# Patient Record
Sex: FEMALE | Race: WHITE | NOT HISPANIC OR LATINO | Employment: UNEMPLOYED | URBAN - NONMETROPOLITAN AREA
[De-identification: names, ages, dates, MRNs, and addresses within clinical notes are randomized per-mention and may not be internally consistent; named-entity substitution may affect disease eponyms.]

---

## 2017-01-22 ENCOUNTER — COMMUNICATION - GICH (OUTPATIENT)
Dept: FAMILY MEDICINE | Facility: OTHER | Age: 57
End: 2017-01-22

## 2017-01-22 DIAGNOSIS — E78.5 HYPERLIPIDEMIA: ICD-10-CM

## 2017-01-22 DIAGNOSIS — F33.0 MAJOR DEPRESSIVE DISORDER, RECURRENT, MILD (H): ICD-10-CM

## 2017-04-21 ENCOUNTER — COMMUNICATION - GICH (OUTPATIENT)
Dept: FAMILY MEDICINE | Facility: OTHER | Age: 57
End: 2017-04-21

## 2017-04-21 DIAGNOSIS — F33.0 MAJOR DEPRESSIVE DISORDER, RECURRENT, MILD (H): ICD-10-CM

## 2017-04-21 DIAGNOSIS — E78.5 HYPERLIPIDEMIA: ICD-10-CM

## 2017-06-01 ENCOUNTER — OFFICE VISIT - GICH (OUTPATIENT)
Dept: FAMILY MEDICINE | Facility: OTHER | Age: 57
End: 2017-06-01

## 2017-06-01 ENCOUNTER — HISTORY (OUTPATIENT)
Dept: FAMILY MEDICINE | Facility: OTHER | Age: 57
End: 2017-06-01

## 2017-06-01 DIAGNOSIS — F33.0 MAJOR DEPRESSIVE DISORDER, RECURRENT, MILD (H): ICD-10-CM

## 2017-06-01 DIAGNOSIS — Z12.31 ENCOUNTER FOR SCREENING MAMMOGRAM FOR MALIGNANT NEOPLASM OF BREAST: ICD-10-CM

## 2017-06-01 DIAGNOSIS — Z12.4 ENCOUNTER FOR SCREENING FOR MALIGNANT NEOPLASM OF CERVIX: ICD-10-CM

## 2017-06-01 DIAGNOSIS — E78.5 HYPERLIPIDEMIA: ICD-10-CM

## 2017-06-01 DIAGNOSIS — Z00.00 ENCOUNTER FOR GENERAL ADULT MEDICAL EXAMINATION WITHOUT ABNORMAL FINDINGS: ICD-10-CM

## 2017-06-01 LAB
A/G RATIO - HISTORICAL: 1.8 (ref 1–2)
ALBUMIN SERPL-MCNC: 4.6 G/DL (ref 3.5–5.7)
ALP SERPL-CCNC: 101 IU/L (ref 34–104)
ALT (SGPT) - HISTORICAL: 29 IU/L (ref 7–52)
ANION GAP - HISTORICAL: 8 (ref 5–18)
AST SERPL-CCNC: 30 IU/L (ref 13–39)
BILIRUB SERPL-MCNC: 0.4 MG/DL (ref 0.3–1)
BUN SERPL-MCNC: 15 MG/DL (ref 7–25)
BUN/CREAT RATIO - HISTORICAL: 15
CALCIUM SERPL-MCNC: 9.6 MG/DL (ref 8.6–10.3)
CHLORIDE SERPLBLD-SCNC: 105 MMOL/L (ref 98–107)
CHOL/HDL RATIO - HISTORICAL: 3.31
CHOLESTEROL TOTAL: 205 MG/DL
CO2 SERPL-SCNC: 26 MMOL/L (ref 21–31)
CREAT SERPL-MCNC: 1.02 MG/DL (ref 0.7–1.3)
GFR IF NOT AFRICAN AMERICAN - HISTORICAL: 56 ML/MIN/1.73M2
GLOBULIN - HISTORICAL: 2.6 G/DL (ref 2–3.7)
GLUCOSE SERPL-MCNC: 96 MG/DL (ref 70–105)
HDLC SERPL-MCNC: 62 MG/DL (ref 23–92)
LDLC SERPL CALC-MCNC: 124 MG/DL
NON-HDL CHOLESTEROL - HISTORICAL: 143 MG/DL
PATIENT STATUS - HISTORICAL: ABNORMAL
POTASSIUM SERPL-SCNC: 4.5 MMOL/L (ref 3.5–5.1)
PROT SERPL-MCNC: 7.2 G/DL (ref 6.4–8.9)
SODIUM SERPL-SCNC: 139 MMOL/L (ref 133–143)
TRIGL SERPL-MCNC: 97 MG/DL

## 2017-12-27 NOTE — PROGRESS NOTES
"Patient Information     Patient Name MRN Sex Lyn Jackman [id number] Female 1960      Progress Notes by Kika Tellez MD at 2017  8:30 AM     Author:  Kika Tellez MD Service:  (none) Author Type:  Physician     Filed:  2017  9:15 AM Encounter Date:  2017 Status:  Signed     :  Kika Tellez MD (Physician)              SUBJECTIVE:    Lyn Massey is a 56 y.o. female who presents for physical, medication refills and is overdue for lab work.   Again discussed colonoscopy and she will consider but is not ready to schedule.  Continues to smoke at about one half pack per day and is not motivated to quit. She has quit at intervals in the past. Indicates that it's been \"a stressful year\" with her daughter again back in the [employer] program with her eating disorder this last year.    PROBLEM LIST:  Patient Active Problem List     Diagnosis  Code     Hyperlipidemia E78.5     Factor V Leiden (HC) D68.51     TOBACCO ABUSE F17.200     Dystrophic nail L60.3     Major depression in remission (HC) F32.5     PAST MEDICAL HISTORY:  Past Medical History:     Diagnosis  Date     Fracture of finger, left 2011    Index finger and laceration      Hx of pregnancy     [id number] spontaneous first trimester losses      SURGICAL HISTORY:  Past Surgical History:      Procedure  Laterality Date      SECTION       SHOULDER ARTHROSCOPY  2008    Right         SOCIAL HISTORY:  Social History     Social History        Marital status:       Spouse name: Manish     Number of children:  1     Years of education:  N/A     Occupational History       Fire aviation dispatcher [employer]     Social History Main Topics         Smoking status:   Current Every Day Smoker     Packs/day:  0.25     Years:  30.00     Types:  Cigarettes     Smokeless tobacco:   Never Used     Alcohol use   No      Comment: wine 6 glasses per week      Drug use:   No     Sexual activity:   Yes     Partners:  Male "     Other Topics   Concern     Caffeine Concern  No     3-4 cups a day in morning      Special Diet  No     Exercise  Yes     Walks, PT for right shoulder      Seat Belt  Yes     Social History Narrative     She is .  She lives in [place].     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                   FAMILY HISTORY:  Family History       Problem   Relation Age of Onset     Stroke  Father      Other  Father      AAA, kidney disease       Other  Mother      Blood clots       Factor V Leiden deficiency  Mother      Blood Disease  Sister      Factor V leiden deficiency       Blood Disease  Sister      Factor V leiden deficiency       Blood Disease  Daughter      Factor V Leiden deficiency       Eating disorder  Daughter       CURRENT MEDICATIONS:   Current Outpatient Prescriptions       Medication  Sig Dispense Refill     multivitamin (MVI) tablet Take 1 tablet by mouth once daily.       rosuvastatin (CRESTOR) 10 mg tablet Take 1 tablet by mouth once daily with evening meal. 90 tablet 9     sertraline (ZOLOFT) 100 mg tablet Take 1 tablet by mouth once daily. 90 tablet 4     No current facility-administered medications for this visit.      Medications have been reviewed by me and are current to the best of my knowledge and ability.    ALLERGIES:  Review of patient's allergies indicates no known allergies.     REVIEW OF SYSTEMS:  General: denies any general problems.  Eyes: denies problems  Ears/Nose/Throat: denies problems  Cardiovascular: denies problems  Respiratory: denies problems  Gastrointestinal: denies problems  Genitourinary: denies problems  Musculoskeletal: denies problems  Skin: denies problems  Neurologic: denies problems  Psychiatric: See HPI, continues on zoloft  Endocrine: denies problems  Heme/Lymphatic: denies problems  Allergic/Immunologic: denies problems  PHQ Depression Screening 6/1/2017   Date of PHQ  "exam (doc flow) 6/1/2017   1. Lack of interest/pleasure 0 - Not at all   2. Feeling down/depressed 0 - Not at all   PHQ-2 TOTAL SCORE 0   3. Trouble sleeping -   4. Decreased energy -   5. Appetite change -   6. Feelings of failure -   7. Trouble concentrating -   8. Activity level -   9. Hurting yourself -   PHQ-9 TOTAL SCORE -   PHQ-9 Severity Level -   Functional Impairment -      OBJECTIVE:  /90  Pulse 60  Ht 1.56 m (5' 1.42\")  Wt 57 kg (125 lb 9.6 oz)  LMP 08/15/2012  BMI 23.41 kg/m2  EXAM:   General Appearance: Pleasant, alert, appropriate appearance for age. No acute distress  Ear Exam: Normal TM's bilaterally. Normal auditory canals and external ears. Non-tender.  Nose Exam: Normal external nose, mucus membranes, and septum.  OroPharynx Exam:  Dental hygiene adequate. Normal buccal mucose. Normal pharynx.  Neck Exam:  Supple, no masses or nodes.  Thyroid Exam: No nodules or enlargement.  Chest/Respiratory Exam: Normal chest wall and respirations. Clear to auscultation.  Breast Exam: No dimpling, nipple retraction or discharge. No masses or nodes.  Cardiovascular Exam: Regular rate and rhythm. S1, S2, no murmur, click, gallop, or rubs.  Gastrointestinal Exam: Soft, non-tender, no masses or organomegaly.  Rectal Exam: Normal sphincter tone. No masses noted.  Genitourinary Exam Female: External genitalia, vulva and vagina appear atrophic. Cervix also atrophic in appearance with a stenotic os and pap obtained from the surface, unable to use sample endocervical canal. Bimanual exam reveals normal uterus and adnexa, nontender urethra and bladder.   Lymphatic Exam: Non-palpable nodes in neck, clavicular, axillary, or inguinal regions.  Musculoskeletal Exam: Back is straight and non-tender, full ROM of upper and lower extremities.  Foot Exam: Left and right foot: good pedal pulses, no lesions, nail hygiene good.  Skin: no rash or abnormalities  Neurologic Exam: Nonfocal, symmetric DTRs, normal gross " motor, tone coordination and no tremor.  Psychiatric Exam: Alert and oriented - appropriate affect.    ASSESSMENT/PLAN    ICD-10-CM    1. Health maintenance examination Z00.00    2. Hyperlipidemia, unspecified hyperlipidemia type E78.5 COMPLETE METABOLIC PANEL      LIPID PANEL      XR MAMMO BILAT SCREENING      rosuvastatin (CRESTOR) 10 mg tablet      COMPLETE METABOLIC PANEL      LIPID PANEL   3. Major depressive disorder, recurrent episode, mild (HC) F33.0 sertraline (ZOLOFT) 100 mg tablet   4. Screening mammogram, encounter for Z12.31 XR MAMMO BILAT SCREENING   5. Pap smear for cervical cancer screening Z12.4 GYN THIN PREP PAP SCREEN IMAGED      GYN THIN PREP PAP SCREEN IMAGED     Ms. Massey's Body mass index is 23.41 kg/(m^2). This is within the normal range for a 56 y.o. Normal range for ages 18+ is between 18.5 and 24.9.  BP Readings from Last 1 Encounters:06/01/17 : 158/90  Ms. Robins blood pressure is out of the normal range for adults. Per JNC-8 guidelines normal adult blood pressure is < 120/80, pre-hypertensive is between 120/80 and 139/89, and hypertension is 140/90 or greater. Risks of hypertension were discussed. Patient's strategy will be to recheck BP outside the clinic setting and if continues elevated then follow up.    Plan:  Mammogram scheduled.  Pap done.  Medications refilled.  Consider colonoscopy and call to schedule at her conveniencel.  Follow up annually.  Kika Tellez MD  9:06 AM 6/1/2017

## 2017-12-28 NOTE — PATIENT INSTRUCTIONS
Patient Information     Patient Name MRN Sex Lyn Jackman [id number] Female 1960      Patient Instructions by Kika Tellez MD at 2017  8:41 AM     Author:  Kika Tellez MD  Service:  (none) Author Type:  Physician     Filed:  2017  8:42 AM  Encounter Date:  2017 Status:  Addendum     :  Kika Tellez MD (Physician)        Related Notes: Original Note by Kika Tellez MD (Physician) filed at 2017  8:41 AM               Index [place] Islander All languages Related topics   Colonoscopy   ________________________________________________________________________  KEY POINTS    A colonoscopy is an exam of your large intestine, also called the colon, with a thin, flexible, lighted tube and tiny camera. This scope is put through your rectum and into your large intestine.    A colonoscopy is used to check for growths or cancer, or to find the cause of symptoms such as diarrhea, rectal bleeding, or other problems in your intestines.    You will be given instructions for clearing bowel movements from your intestines. Be sure to complete the bowel preparation as instructed, including what types of food and drink you can have in the days leading up to the procedure.  ________________________________________________________________________  What is a colonoscopy?  A colonoscopy is an exam of your large intestine, also called the colon, with a thin, flexible, lighted tube and tiny camera. This scope is put through your rectum and into your large intestine.  When is it used?  Colonoscopy is the most direct and complete way to check the entire lining of the colon. It is usually done for one of the following reasons:    Prevention and early detection of cancer. A colonoscopy can help your healthcare provider find growths (polyps) that might become cancer. The growths can then be removed before they become cancer. It can also help find colon cancer early, when the cancer is easier to  cure.  If you are 50 to 75 years old, your healthcare provider may recommend that you have a screening colonoscopy at least every 10 years. If you have a personal or family history that increases your risk of colon or rectal cancer, your provider may recommend that you start having the test at an earlier age and have the test more often. In some cases, the test may be recommended for people older than 75. People who are -American may have a screening colonoscopy at age 45.    Diagnosis of illness. If you have symptoms such as diarrhea, rectal bleeding, losing weight without trying to, or intestinal problems, you may have this test to try to find the cause of your symptoms.  How do I prepare for this procedure?    Find someone to give you a ride home after the procedure. You will not be allowed to drive yourself home.    Your healthcare provider will tell you when to stop eating and drinking before the procedure. This helps to keep you from vomiting during the procedure.    You may or may not need to take your regular medicines the day of the procedure. Tell your healthcare provider about all medicines and supplements that you take. Some products may increase your risk of side effects. Ask your healthcare provider if you need to avoid taking any medicine or supplements before the procedure.    Tell your healthcare provider if you have any food, medicine, or other allergies such as latex.    Follow any other instructions your healthcare provider gives you.    You will be given instructions for clearing bowel movements from your intestines. Be sure to complete the bowel preparation as instructed, including what types of food and drink you can have in the days leading up to the procedure. The exam may not be done or may have to be repeated if your intestine still has bowel movement in it. Medicines used to prepare for this procedure will cause you to have several watery bowel movements until only clear movements  occur. Stay close to the bathroom after you take the medicine. Talk to your pharmacist or healthcare provider about other symptoms you might have.    Ask any questions you have before the procedure. You should understand what your healthcare provider is going to do. You have the right to make decisions about your healthcare and to give permission for any tests or procedures.  What happens during this procedure?  This procedure may be done in the healthcare provider's office, outpatient clinic, or hospital.  Before the procedure you will be given medicine to help you relax, but you may be awake during the procedure.  You will lie on a table on your side with your knees bent and drawn up to your stomach. Your healthcare provider will pass the scope through your rectum and into your lower intestine and view the images of your intestines on a computer screen. Small amounts of air will be passed into your intestines so your provider can see as much of the area as possible.  If your provider sees anything abnormal during the exam, he or she may take small samples of tissue through the scope for lab tests. This is called a biopsy. Your provider may be able to remove polyps or small tumors through the scope.  What happens after this procedure?  After the procedure, you may stay in a recovery area until you are awake and alert enough to be driven home. It is normal to have gas and mild cramps for a few hours after the exam. This will last until your body passes the extra air. If polyps or other tissue is removed, you may see a small amount of blood in your bowel movements for a short time.  Follow your healthcare provider's instructions. Ask your provider:    How long it will take to recover    If there are activities you should avoid and when you can return to your normal activities    How to take care of yourself at home    What symptoms or problems you should watch for and what to do if you have them  Make sure you know  when you should come back for a checkup. Keep all appointments for provider visits or tests.  What are the risks of this procedure?  Every procedure or treatment has risks. Some possible risks of this procedure include:    You may have problems with anesthesia.    You may have infection or bleeding.    Other parts of your body may be injured during the procedure.  Ask your healthcare provider how the risks apply to you. Be sure to discuss any other questions or concerns that you may have.   Developed by [employer].  Adult Advisor 2016.3 published by [employer].  Last modified: 2016-03-30  Last reviewed: 2016-03-22  This content is reviewed periodically and is subject to change as new health information becomes available. The information is intended to inform and educate and is not a replacement for medical evaluation, advice, diagnosis or treatment by a healthcare professional.  References   Adult Advisor 2016.3 Index    Copyright   2016 [employer], a division of [employer] All rights reserved.

## 2018-01-03 NOTE — TELEPHONE ENCOUNTER
Patient Information     Patient Name MRN Lyn Feliciano [id number] Female 1960      Telephone Encounter by Kelly Campbell RN at 2017 12:11 PM     Author:  Kelly Campbell RN Service:  (none) Author Type:  (none)     Filed:  2017 12:13 PM Encounter Date:  2017 Status:  Signed     :  Kelly Campbell RN (NURS- Registered Nurse)            Statins    Office visit in the past 12 months.    Last visit with CARLTON HEWITT was on: 2015 in [facility]  Next visit with CARLTON HEWITT is on: No future appointment listed with this provider  Next visit with [facility] is on: No future appointment listed in this department    Lab testing requirements:  Lipids annually.  Repeat lipids 6-8 weeks after dosage or drug change.    Last Lipids:  Chol: 243    9/15/2015  T    9/15/2015  HDL:   57    9/15/2015  LDL:  160    9/15/2015  LDL DIRECT:  No results found in past 5 years    .    Concommitant use of fibrates and statins-If it is an addition to the medication list, review note and/or discuss with provider.  If already on medication list, refill.    Max refills 12 months from last office visit.      Depression-in adults 18 and over  SSRI    Office visit in the past 12 months or as indicated in chart.  Should have clinic visit 1-2 months after initial prescription.    Last visit with CARLTON HEWITT was on: 2015 in [facility]  Next visit with CARLTON HEWITT is on: No future appointment listed with this provider  Next visit with [facility] is on: No future appointment listed in this department    Max refills 12 months from last office visit or per providers notes.    Patient is due for medication management appointment. Limited refill provided at this time and letter sent for reminder to patient. Prescription refilled per RN Medication Refill Policy.................... Kelly Campbell ....................   1/23/2017   12:12 PM

## 2018-01-04 NOTE — TELEPHONE ENCOUNTER
Patient Information     Patient Name MRN Lyn Feliciano [id number] Female 1960      Telephone Encounter by Kelly Campbell RN at 2017  9:11 AM     Author:  Kelly Campbell RN Service:  (none) Author Type:  (none)     Filed:  2017  9:15 AM Encounter Date:  2017 Status:  Signed     :  Kelly Campbell RN (NURS- Registered Nurse)            Statins    Office visit in the past 12 months.    Last visit with CARLTON HEWITT was on: 2015 in [facility]  Next visit with CARLTON HEWITT is on: No future appointment listed with this provider  Next visit with [facility] is on: No future appointment listed in this department    Lab testing requirements:  Lipids annually.  Repeat lipids 6-8 weeks after dosage or drug change.    Last Lipids:  Chol: 243    9/15/2015  T    9/15/2015  HDL:   57    9/15/2015  LDL:  160    9/15/2015  LDL DIRECT:  No results found in past 5 years    .    Concommitant use of fibrates and statins-If it is an addition to the medication list, review note and/or discuss with provider.  If already on medication list, refill.    Max refills 12 months from last office visit.      Depression-in adults 18 and over  SSRI    Office visit in the past 12 months or as indicated in chart.  Should have clinic visit 1-2 months after initial prescription.    Last visit with CARLTON HEWITT was on: 2015 in [facility]  Next visit with CARLTON HEWITT is on: No future appointment listed with this provider  Next visit with Family Practice is on: No future appointment listed in this department    Max refills 12 months from last office visit or per providers notes.    Spoke with patient - patient states she will call back to schedule an annual review of medications and labs. Patient would like a short 30 day supply of medication.    Prescription refilled per RN Medication Refill Policy.................... Kelly ADAME  Shannan ....................  4/21/2017   9:13 AM

## 2018-01-27 VITALS
HEIGHT: 61 IN | SYSTOLIC BLOOD PRESSURE: 158 MMHG | WEIGHT: 125.6 LBS | BODY MASS INDEX: 23.71 KG/M2 | DIASTOLIC BLOOD PRESSURE: 90 MMHG | HEART RATE: 60 BPM

## 2018-02-19 ENCOUNTER — DOCUMENTATION ONLY (OUTPATIENT)
Dept: FAMILY MEDICINE | Facility: OTHER | Age: 58
End: 2018-02-19

## 2018-02-19 PROBLEM — Z72.0 TOBACCO ABUSE: Status: ACTIVE | Noted: 2018-02-19

## 2018-02-19 PROBLEM — E78.5 HYPERLIPIDEMIA: Status: ACTIVE | Noted: 2018-02-19

## 2018-02-19 PROBLEM — D68.51 FACTOR V LEIDEN (H): Status: ACTIVE | Noted: 2018-02-19

## 2018-02-19 RX ORDER — SERTRALINE HYDROCHLORIDE 100 MG/1
100 TABLET, FILM COATED ORAL DAILY
COMMUNITY
Start: 2017-06-01 | End: 2018-06-16

## 2018-02-19 RX ORDER — ROSUVASTATIN CALCIUM 10 MG/1
10 TABLET, COATED ORAL EVERY EVENING
COMMUNITY
Start: 2017-06-01 | End: 2018-06-16

## 2018-02-19 RX ORDER — DIPHENOXYLATE HYDROCHLORIDE AND ATROPINE SULFATE 2.5; .025 MG/1; MG/1
1 TABLET ORAL DAILY
COMMUNITY
End: 2022-10-13

## 2018-06-16 DIAGNOSIS — F32.5 MAJOR DEPRESSION IN REMISSION (H): ICD-10-CM

## 2018-06-16 DIAGNOSIS — E78.5 HYPERLIPIDEMIA: Primary | ICD-10-CM

## 2018-06-16 NOTE — LETTER
June 19, 2018      Lyn Massey  [address] [place] [address]        Dear Lyn,     This letter is to remind you that you are due for your annual exam.  Your last comprehensive visit was more than 12 months ago.    A LIMITED refill of your medications have been called into your pharmacy. Additional refills require you to complete this appointment.    Please call the clinic at [phone] to schedule your appointment.    If you should require additional refills before your scheduled appointment, please contact your pharmacy and we will refill your medication until the date of your appointment.    Thank you for choosing [facility] for your health care needs.    Sincerely,    Refill RN  [facility]

## 2018-06-19 RX ORDER — ROSUVASTATIN CALCIUM 10 MG/1
TABLET, COATED ORAL
Qty: 90 TABLET | Refills: 0 | Status: SHIPPED | OUTPATIENT
Start: 2018-06-19 | End: 2018-09-24

## 2018-06-19 RX ORDER — SERTRALINE HYDROCHLORIDE 100 MG/1
TABLET, FILM COATED ORAL
Qty: 90 TABLET | Refills: 0 | Status: SHIPPED | OUTPATIENT
Start: 2018-06-19 | End: 2018-09-24

## 2018-06-19 NOTE — TELEPHONE ENCOUNTER
Medication is being filled for 1 time refill only due to:  Patient needs to be seen because it has been more than one year since last visit.   Patient is due for medication management appointment. Limited refill provided at this time. [employer] message and/or letter sent for reminder to patient. Prescription refilled per RN Medication Refill Policy.................... Chioma Douglass ....................  6/19/2018   9:54 AM

## 2018-07-23 NOTE — PROGRESS NOTES
Patient Information     Patient Name  Lyn Massey MRN  [id number] Sex  Female   1960      Letter by Kika Tellez MD at      Author:  Kika Tellez MD Service:  (none) Author Type:  (none)    Filed:   Encounter Date:  2017 Status:  (Other)           Lyn Massey  [address]  [facility] [address]          2017    Dear Ms. Massey:    Following are the tests completed during your last clinic visit.  The results of these tests are normal and require no further attention.    Results for orders placed or performed in visit on 17      COMPLETE METABOLIC PANEL      Result  Value Ref Range    SODIUM 139 133 - 143 mmol/L    POTASSIUM 4.5 3.5 - 5.1 mmol/L    CHLORIDE 105 98 - 107 mmol/L    CO2,TOTAL 26 21 - 31 mmol/L    ANION GAP 8 5 - 18                    GLUCOSE 96 70 - 105 mg/dL    CALCIUM 9.6 8.6 - 10.3 mg/dL    BUN 15 7 - 25 mg/dL    CREATININE 1.02 0.70 - 1.30 mg/dL    BUN/CREAT RATIO           15                    GFR if African American >60 >60 ml/min/1.73m2    GFR if not  56 (L) >60 ml/min/1.73m2    ALBUMIN 4.6 3.5 - 5.7 g/dL    PROTEIN,TOTAL 7.2 6.4 - 8.9 g/dL    GLOBULIN                  2.6 2.0 - 3.7 g/dL    A/G RATIO 1.8 1.0 - 2.0                    BILIRUBIN,TOTAL 0.4 0.3 - 1.0 mg/dL    ALK PHOSPHATASE 101 34 - 104 IU/L    ALT (SGPT) 29 7 - 52 IU/L    AST (SGOT) 30 13 - 39 IU/L   LIPID PANEL      Result  Value Ref Range    CHOLESTEROL,TOTAL 205 (H) <200 mg/dL    TRIGLYCERIDES 97 <150 mg/dL    HDL CHOLESTEROL 62 23 - 92 mg/dL    NON-HDL CHOLESTEROL 143 <145 mg/dl    CHOL/HDL RATIO            3.31 <4.50                    LDL CHOLESTEROL 124 (H) <100 mg/dL    PATIENT STATUS            NOT GIVEN                         If you have any further questions or problems contact my office at  [phone].    Sincerely,    Kika Tellez MD  11:04 AM 2017

## 2018-07-23 NOTE — PROGRESS NOTES
Patient Information     Patient Name  Lyn Massey MRN  [id number] Sex  Female   1960      Letter by Kika Tellez MD at      Author:  Kika Tellez MD Service:  (none) Author Type:  (none)    Filed:   Encounter Date:  2017 Status:  (Other)           Lyn [place]  [address] [facility] [facility] [address]          2017    Dear Ms. Massey:    A LIMITED refill of atorvastatin (LIPITOR) 40 mg tablet and sertraline (ZOLOFT) 100 mg tablet has been called into your pharmacy.    Additional refills require a medication management and annual lab appointment with Kika Tellez MD. Please call the clinic at [phone] to schedule your appointment.    Thank you,    The Refill Nurse  [facility]

## 2018-07-23 NOTE — PROGRESS NOTES
Patient Information     Patient Name  Lyn Massey MRN  [id number] Sex  Female   1960      Letter by Kika Tellez MD at      Author:  Kika Tellez MD Service:  (none) Author Type:  (none)    Filed:   Encounter Date:  2017 Status:  (Other)           Lyn Massey  [address]  [facility] [address]          2017    Dear Ms. Massey:    The result from the Pap test(s) you had done at your recent clinic visit came back as normal.     We recommend that you have an adult physical exam each year. Your next Pap can be in 3 years.    If you have any further questions or concerns, please call [phone]. You may also contact us by using medical messaging if you have MyChart.    Thank you for choosing [facility] to participate in your healthcare needs.    Sincerely,  Kika Tellez MD  1:42 PM 2017

## 2018-11-06 ENCOUNTER — TELEPHONE (OUTPATIENT)
Dept: FAMILY MEDICINE | Facility: OTHER | Age: 58
End: 2018-11-06

## 2018-11-06 DIAGNOSIS — F32.5 MAJOR DEPRESSION IN REMISSION (H): ICD-10-CM

## 2018-11-06 DIAGNOSIS — E78.2 MIXED HYPERLIPIDEMIA: ICD-10-CM

## 2018-11-06 RX ORDER — ROSUVASTATIN CALCIUM 10 MG/1
10 TABLET, COATED ORAL
Qty: 30 TABLET | Refills: 0 | Status: SHIPPED | OUTPATIENT
Start: 2018-11-06 | End: 2018-11-30

## 2018-11-06 RX ORDER — SERTRALINE HYDROCHLORIDE 100 MG/1
100 TABLET, FILM COATED ORAL DAILY
Qty: 30 TABLET | Refills: 0 | Status: SHIPPED | OUTPATIENT
Start: 2018-11-06 | End: 2018-11-30

## 2018-11-06 NOTE — TELEPHONE ENCOUNTER
TJR - Patient requesting medications that were faxed by [facility]. Patient is scheduled with provider on 11/27/18 but is out of medications at this time.

## 2018-11-26 ENCOUNTER — OFFICE VISIT (OUTPATIENT)
Dept: FAMILY MEDICINE | Facility: OTHER | Age: 58
End: 2018-11-26
Attending: FAMILY MEDICINE
Payer: COMMERCIAL

## 2018-11-26 VITALS
WEIGHT: 127.8 LBS | DIASTOLIC BLOOD PRESSURE: 82 MMHG | HEART RATE: 64 BPM | BODY MASS INDEX: 23.82 KG/M2 | SYSTOLIC BLOOD PRESSURE: 134 MMHG

## 2018-11-26 DIAGNOSIS — Z12.11 ENCOUNTER FOR SCREENING COLONOSCOPY: ICD-10-CM

## 2018-11-26 DIAGNOSIS — Z12.31 ENCOUNTER FOR SCREENING MAMMOGRAM FOR BREAST CANCER: ICD-10-CM

## 2018-11-26 DIAGNOSIS — Z72.0 TOBACCO ABUSE: ICD-10-CM

## 2018-11-26 DIAGNOSIS — Z23 ENCOUNTER FOR IMMUNIZATION: ICD-10-CM

## 2018-11-26 DIAGNOSIS — Z79.899 MEDICATION MANAGEMENT: ICD-10-CM

## 2018-11-26 DIAGNOSIS — Z00.00 HEALTH MAINTENANCE EXAMINATION: Primary | ICD-10-CM

## 2018-11-26 DIAGNOSIS — E78.2 MIXED HYPERLIPIDEMIA: ICD-10-CM

## 2018-11-26 LAB
ALBUMIN SERPL-MCNC: 4.7 G/DL (ref 3.5–5.7)
ALP SERPL-CCNC: 85 U/L (ref 34–104)
ALT SERPL W P-5'-P-CCNC: 20 U/L (ref 7–52)
ANION GAP SERPL CALCULATED.3IONS-SCNC: 5 MMOL/L (ref 3–14)
AST SERPL W P-5'-P-CCNC: 25 U/L (ref 13–39)
BILIRUB SERPL-MCNC: 0.4 MG/DL (ref 0.3–1)
BUN SERPL-MCNC: 19 MG/DL (ref 7–25)
CALCIUM SERPL-MCNC: 9.6 MG/DL (ref 8.6–10.3)
CHLORIDE SERPL-SCNC: 102 MMOL/L (ref 98–107)
CHOLEST SERPL-MCNC: 248 MG/DL
CO2 SERPL-SCNC: 29 MMOL/L (ref 21–31)
CREAT SERPL-MCNC: 0.93 MG/DL (ref 0.6–1.2)
ERYTHROCYTE [DISTWIDTH] IN BLOOD BY AUTOMATED COUNT: 12.8 % (ref 10–15)
GFR SERPL CREATININE-BSD FRML MDRD: 62 ML/MIN/1.7M2
GLUCOSE SERPL-MCNC: 106 MG/DL (ref 70–105)
HCT VFR BLD AUTO: 47.1 % (ref 35–47)
HDLC SERPL-MCNC: 73 MG/DL (ref 23–92)
HGB BLD-MCNC: 15.7 G/DL (ref 11.7–15.7)
LDLC SERPL CALC-MCNC: 154 MG/DL
MCH RBC QN AUTO: 31.1 PG (ref 26.5–33)
MCHC RBC AUTO-ENTMCNC: 33.3 G/DL (ref 31.5–36.5)
MCV RBC AUTO: 93 FL (ref 78–100)
NONHDLC SERPL-MCNC: 175 MG/DL
PLATELET # BLD AUTO: 284 10E9/L (ref 150–450)
POTASSIUM SERPL-SCNC: 4.5 MMOL/L (ref 3.5–5.1)
PROT SERPL-MCNC: 7.7 G/DL (ref 6.4–8.9)
RBC # BLD AUTO: 5.05 10E12/L (ref 3.8–5.2)
SODIUM SERPL-SCNC: 136 MMOL/L (ref 134–144)
TRIGL SERPL-MCNC: 106 MG/DL
TSH SERPL DL<=0.05 MIU/L-ACNC: 1.65 IU/ML (ref 0.34–5.6)
WBC # BLD AUTO: 7.4 10E9/L (ref 4–11)

## 2018-11-26 PROCEDURE — 80061 LIPID PANEL: CPT | Performed by: FAMILY MEDICINE

## 2018-11-26 PROCEDURE — 85027 COMPLETE CBC AUTOMATED: CPT | Performed by: FAMILY MEDICINE

## 2018-11-26 PROCEDURE — 84443 ASSAY THYROID STIM HORMONE: CPT | Performed by: FAMILY MEDICINE

## 2018-11-26 PROCEDURE — 99396 PREV VISIT EST AGE 40-64: CPT | Mod: 25 | Performed by: FAMILY MEDICINE

## 2018-11-26 PROCEDURE — 80053 COMPREHEN METABOLIC PANEL: CPT | Performed by: FAMILY MEDICINE

## 2018-11-26 PROCEDURE — 90471 IMMUNIZATION ADMIN: CPT | Performed by: FAMILY MEDICINE

## 2018-11-26 PROCEDURE — 90686 IIV4 VACC NO PRSV 0.5 ML IM: CPT | Performed by: FAMILY MEDICINE

## 2018-11-26 PROCEDURE — 36415 COLL VENOUS BLD VENIPUNCTURE: CPT | Performed by: FAMILY MEDICINE

## 2018-11-26 ASSESSMENT — PATIENT HEALTH QUESTIONNAIRE - PHQ9: SUM OF ALL RESPONSES TO PHQ QUESTIONS 1-9: 0

## 2018-11-26 ASSESSMENT — PAIN SCALES - GENERAL: PAINLEVEL: NO PAIN (0)

## 2018-11-26 NOTE — PROGRESS NOTES
Nursing Notes:   Georgie Trammell  2018  9:03 AM  Signed  Patient presents to the clinic today for a physical.   Georgie Trammell LPN 2018   8:47 AM    Med rec-complete    SUBJECTIVE:   Lyn Massey is a 57 year old female who presents to clinic today for the following health issues:  Today for annual review, physical and would like lab work.  Her  has convinced her she should have a colonoscopy and I also encouraged her today to have her mammogram this year.  It appears she has not had a mammogram since .  She has no complaints and has been feeling well.  She stays active but continues to smoke and has no current plan to quit.    HPI    Patient Active Problem List   Diagnosis     Dystrophic nail     Factor V Leiden (H)     Hyperlipidemia     Major depression in remission (H)     Tobacco abuse     Past Surgical History:   Procedure Laterality Date     ARTHROSCOPY SHOULDER Right 2008      SECTION      1994       Social History   Substance Use Topics     Smoking status: Current Every Day Smoker     Packs/day: 0.25     Years: 30.00     Types: Cigarettes     Smokeless tobacco: Never Used     Alcohol use No      Comment: Alcoholic Drinks/day: wine 6 glasses per week     Family History   Problem Relation Age of Onset     Other - See Comments Father      Stroke/AAA, kidney disease     Other - See Comments Mother      Blood clots/Factor V Leiden deficiency     Blood Disease Sister      Blood Disease,Factor V leiden deficiency     Blood Disease Sister      Blood Disease,Factor V leiden deficiency     Blood Disease Daughter      Blood Disease,Factor V Leiden deficiency     Eating Disorder Daughter      Eating disorder         Current Outpatient Prescriptions   Medication Sig Dispense Refill     Multiple Vitamin (MULTI-VITAMINS) TABS Take 1 tablet by mouth daily       rosuvastatin (CRESTOR) 10 MG tablet Take 1 tablet (10 mg) by mouth daily (with dinner) 30 tablet 0     sertraline (ZOLOFT) 100  MG tablet Take 1 tablet (100 mg) by mouth daily 30 tablet 0     No Known Allergies    Review of Systems     OBJECTIVE:     /82 (BP Location: Right arm, Patient Position: Sitting, Cuff Size: Adult Regular)  Pulse 64  Wt 127 lb 12.8 oz (58 kg)  Breastfeeding? No  BMI 23.82 kg/m2  Body mass index is 23.82 kg/(m^2).  Physical Exam   Constitutional: She appears well-developed and well-nourished. No distress.   HENT:   Head: Normocephalic.   Right Ear: External ear normal.   Left Ear: External ear normal.   Neck: Normal range of motion. Neck supple. No thyromegaly present.   Cardiovascular: Normal rate, regular rhythm and normal heart sounds.  Exam reveals no gallop.    No murmur heard.  Pulmonary/Chest: Effort normal. No respiratory distress. She has no wheezes. She has no rales.   Lymphadenopathy:     She has no cervical adenopathy.   Neurological: She is alert.   Skin: Skin is warm and dry.   Psychiatric: She has a normal mood and affect.   Nursing note and vitals reviewed.      Diagnostic Test Results: Pending    ASSESSMENT/PLAN:         ICD-10-CM    1. Health maintenance examination Z00.00    2. Mixed hyperlipidemia E78.2 Lipid Panel     Comprehensive Metabolic Panel     CBC W PLT No Diff     TSH     Lipid Panel     Comprehensive Metabolic Panel     CBC W PLT No Diff     TSH   3. Tobacco abuse Z72.0 Lipid Panel     Comprehensive Metabolic Panel     CBC W PLT No Diff     TSH     Lipid Panel     Comprehensive Metabolic Panel     CBC W PLT No Diff     TSH   4. Medication management Z79.899    5. Encounter for screening colonoscopy Z12.11 GASTROENTEROLOGY ADULT REF PROCEDURE ONLY   6. Encounter for immunization Z23  IMM-  HC FLU VAC PRESRV FREE QUAD SPLIT VIR 3+YRS IM   7. Encounter for screening mammogram for breast cancer Z12.31 MA Screening Digital Bilateral     Plan:  Labs pending at time of dictation will be sent to patient.  Influenza vaccine given.  Referral for colonoscopy and mammogram  ordered.  Follow-up encouraged annually and refills provided.  Smoking cessation addressed and she has quit in the past.  Her spouse does not smoke.  She is not at this point motivated to quit.  Kika Tellez MD  [facility]    Portions of this dictation were created using the Dragon Nuance voice recognition system. Proofreading was completed but there may be errors in text.

## 2018-11-26 NOTE — PATIENT INSTRUCTIONS
Prevention Guidelines, Women Ages 50 to 64  Screening tests and vaccines are an important part of managing your health. A screening test is done to find possible disorders or diseases in people who don't have any symptoms. The goal is to find a disease early so lifestyle changes can be made and you can be watched more closely to reduce the risk of disease, or to detect it early enough to treat it most effectively. Screening tests are not considered diagnostic, but are used to determine if more testing is needed. Health counseling is essential, too. Below are guidelines for these, for women ages 50 to 64. Talk with your healthcare provider to make sure you re up to date on what you need.  Screening Who needs it How often   Type 2 diabetes or prediabetes All women beginning at age 45 and women without symptoms at any age who are overweight or obese and have 1 or more additional risk factors for diabetes. At  least every 3 years   Type 2 diabetes or prediabetes All women diagnosed with gestational diabetes Lifelong testing every 3 years   Type 2 diabetes All women with prediabetes Every year   Alcohol misuse All women in this age group At routine exams   Blood pressure All women in this age group Yearly checkup if your blood pressure is normal  Normal blood pressure is less than 120/80 mm Hg  If your blood pressure reading is higher than normal, follow the advice of your healthcare provider   Breast cancer All women at average risk in this age group Yearly mammogram should be done until age 54. At age 55, switch to mammograms every other year or choose to continue yearly mammograms.  All women should be familiar with the potential benefits and risks of breast cancer screening with mammograms.      Cervical cancer All women in this age group, except women who have had a complete hysterectomy Pap test every 3 years or Pap test with human papillomavirus (HPV) test every 5 years   Chlamydia Women at increased risk for  infection At routine exams   Colorectal cancer All women in this age group Flexible sigmoidoscopy every 5 years, or colonoscopy every 10 years, or double-contrast barium enema every 5 years; yearly fecal occult blood test or fecal immunochemical test; or a stool DNA test as often as your health care provider advises; talk with your health care provider about which tests are best for you   Depression All women in this age group At routine exams   Gonorrhea Sexually active women at increased risk for infection At routine exams   Hepatitis C Anyone at increased risk; 1 time for those born between 1945 and 1965 At routine exams   High cholesterol or triglycerides All women in this age group who are at risk for coronary artery disease At least every 5 years   HIV All women At routine exams   Lung cancer Adults age 55 to 80 who have smoked Yearly screening in smokers with 30 pack-year history of smoking or who quit within 15 years   Obesity All women in this age group At routine exams   Osteoporosis Women who are postmenopausal Ask your healthcare provider   Syphilis Women at increased risk for infection   talk with your healthcare provider At routine exams   Tuberculosis Women at increased risk for infection   talk with your healthcare provider Ask your healthcare provider   Vision All women in this age group Ask your healthcare provider   Vaccine Who needs it How often   Chickenpox (varicella) All women in this age group who have no record of this infection or vaccine 2 doses; the second dose should be given at least 4 weeks after the first dose   Hepatitis A Women at increased risk for infection   talk with your healthcare provider 2 doses given at least 6 months apart   Hepatitis B Women at increased risk for infection   talk with your healthcare provider 3 doses over 6 months; second dose should be given 1 month after the first dose; the third dose should be given at least 2 months after the second dose and at least  4 months after the first dose   Haemophilus influenzaeType B (HIB) Women at increased risk for infection   talk with your healthcare provider 1 to 3 doses   Influenza (flu) All women in this age group Once a year   Measles, mumps, rubella (MMR) Women in this age group through their late 50s who have no record of these infections or vaccines 1 dose   Meningococcal Women at increased risk for infection   talk with your healthcare provider 1 or more doses   Pneumococcal conjugate vaccine (PCV13) and pneumococcal polysaccharide vaccine (PPSV23) Women at increased risk for infection   talk with your healthcare provider PCV13: 1 dose ages 19 to 65 (protects against 13 types of pneumococcal bacteria)  PPSV23: 1 to 2 doses through age 64, or 1 dose at 65 or older (protects against 23 types of pneumococcal bacteria)   Tetanus/diphtheria/pertussis (Td/Tdap) booster All women in this age group Td every 10 years, or a one-time dose of Tdap instead of a Td booster after age 18, then Td every 10 years   Zoster All women ages 60 and older 1 dose   Counseling Who needs it How often   BRCA gene mutation testing for breast and ovarian cancer susceptibility Women with increased risk for having gene mutation When your risk is known   Breast cancer and chemoprevention Women at high risk for breast cancer When your risk is known   Diet and exercise Women who are overweight or obese When diagnosed, and then at routine exams   Sexually transmitted infection prevention Women at increased risk for infection   talk with your healthcare provider At routine exams   Use of daily aspirin Women ages 55 and up in this age group who are at risk for cardiovascular health problems such as stroke When your risk is known   Use of tobacco and the health effects it can cause All women in this age group Every exam   1[employer]  Date Last Reviewed: 1/26/2016 2000-2018 [employer]. [address] [place] [address]. All  rights reserved. This information is not intended as a substitute for professional medical care. Always follow your healthcare professional's instructions.

## 2018-11-26 NOTE — LETTER
November 28, 2018      Lyn Massey  [id number] [place] [address]        Dear ,    Following are the results of your recent labs.  Based on these results and your other risk factors your current risk of heart disease is about 5.8%.  This does not indicate a need for medication.  He should continue to work to lower your cholesterol, maintain a healthy body weight and work on smoking cessation.  All other labs are fine.  The thyrotropin level is a thyroid hormone and is normal as well.    The 10-year ASCVD risk score (Karenabdiel BENTON Jr, et al., 2013) is: 5.8%    Values used to calculate the score:      Age: 57 years      Sex: Female      Is Non- : No      Diabetic: No      Tobacco smoker: Yes      Systolic Blood Pressure: 134 mmHg      Is BP treated: No      HDL Cholesterol: 73 mg/dL      Total Cholesterol: 248 mg/dL    Resulted Orders   Lipid Panel   Result Value Ref Range    Cholesterol 248 (H) <200 mg/dL    Triglycerides 106 <150 mg/dL    HDL Cholesterol 73 23 - 92 mg/dL    LDL Cholesterol Calculated 154 (H) <100 mg/dL      Comment:      Above desirable:  100-129 mg/dl  Borderline High:  130-159 mg/dL  High:             160-189 mg/dL  Very high:       >189 mg/dl      Non HDL Cholesterol 175 (H) <130 mg/dL      Comment:      Above Desirable:  130-159 mg/dl  Borderline high:  160-189 mg/dl  High:             190-219 mg/dl  Very high:       >219 mg/dl     Comprehensive Metabolic Panel   Result Value Ref Range    Sodium 136 134 - 144 mmol/L    Potassium 4.5 3.5 - 5.1 mmol/L    Chloride 102 98 - 107 mmol/L    Carbon Dioxide 29 21 - 31 mmol/L    Anion Gap 5 3 - 14 mmol/L    Glucose 106 (H) 70 - 105 mg/dL    Urea Nitrogen 19 7 - 25 mg/dL    Creatinine 0.93 0.60 - 1.20 mg/dL    GFR Estimate 62 >60 mL/min/1.7m2    GFR Estimate If Black 75 >60 mL/min/1.7m2    Calcium 9.6 8.6 - 10.3 mg/dL    Bilirubin Total 0.4 0.3 - 1.0 mg/dL    Albumin 4.7 3.5 - 5.7 g/dL    Protein Total 7.7 6.4 - 8.9 g/dL     Alkaline Phosphatase 85 34 - 104 U/L    ALT 20 7 - 52 U/L    AST 25 13 - 39 U/L   CBC W PLT No Diff   Result Value Ref Range    WBC 7.4 4.0 - 11.0 10e9/L    RBC Count 5.05 3.8 - 5.2 10e12/L    Hemoglobin 15.7 11.7 - 15.7 g/dL    Hematocrit 47.1 (H) 35.0 - 47.0 %    MCV 93 78 - 100 fl    MCH 31.1 26.5 - 33.0 pg    MCHC 33.3 31.5 - 36.5 g/dL    RDW 12.8 10.0 - 15.0 %    Platelet Count 284 150 - 450 10e9/L   TSH   Result Value Ref Range    Thyrotropin 1.65 0.34 - 5.60 IU/mL       If you have any questions or concerns, please call the clinic at the number listed above.       Sincerely,        Kika Tellez MD

## 2018-11-26 NOTE — MR AVS SNAPSHOT
After Visit Summary   11/26/2018    Lyn Massey    MRN: [id number]           Patient Information     Date Of Birth          1960        Visit Information        Provider Department      11/26/2018 8:45 AM Kika Tellez MD [facility] [place] [facility]        Today's Diagnoses     Medication management    -  1    Mixed hyperlipidemia        Tobacco abuse        Encounter for screening colonoscopy        Encounter for immunization        Encounter for screening mammogram for breast cancer          Care Instructions      Prevention Guidelines, Women Ages 50 to 64  Screening tests and vaccines are an important part of managing your health. A screening test is done to find possible disorders or diseases in people who don't have any symptoms. The goal is to find a disease early so lifestyle changes can be made and you can be watched more closely to reduce the risk of disease, or to detect it early enough to treat it most effectively. Screening tests are not considered diagnostic, but are used to determine if more testing is needed. Health counseling is essential, too. Below are guidelines for these, for women ages 50 to 64. Talk with your healthcare provider to make sure you re up to date on what you need.  Screening Who needs it How often   Type 2 diabetes or prediabetes All women beginning at age 45 and women without symptoms at any age who are overweight or obese and have 1 or more additional risk factors for diabetes. At  least every 3 years   Type 2 diabetes or prediabetes All women diagnosed with gestational diabetes Lifelong testing every 3 years   Type 2 diabetes All women with prediabetes Every year   Alcohol misuse All women in this age group At routine exams   Blood pressure All women in this age group Yearly checkup if your blood pressure is normal  Normal blood pressure is less than 120/80 mm Hg  If your blood pressure reading is higher than normal, follow the advice of your  healthcare provider   Breast cancer All women at average risk in this age group Yearly mammogram should be done until age 54. At age 55, switch to mammograms every other year or choose to continue yearly mammograms.  All women should be familiar with the potential benefits and risks of breast cancer screening with mammograms.      Cervical cancer All women in this age group, except women who have had a complete hysterectomy Pap test every 3 years or Pap test with human papillomavirus (HPV) test every 5 years   Chlamydia Women at increased risk for infection At routine exams   Colorectal cancer All women in this age group Flexible sigmoidoscopy every 5 years, or colonoscopy every 10 years, or double-contrast barium enema every 5 years; yearly fecal occult blood test or fecal immunochemical test; or a stool DNA test as often as your health care provider advises; talk with your health care provider about which tests are best for you   Depression All women in this age group At routine exams   Gonorrhea Sexually active women at increased risk for infection At routine exams   Hepatitis C Anyone at increased risk; 1 time for those born between 1945 and 1965 At routine exams   High cholesterol or triglycerides All women in this age group who are at risk for coronary artery disease At least every 5 years   HIV All women At routine exams   Lung cancer Adults age 55 to 80 who have smoked Yearly screening in smokers with 30 pack-year history of smoking or who quit within 15 years   Obesity All women in this age group At routine exams   Osteoporosis Women who are postmenopausal Ask your healthcare provider   Syphilis Women at increased risk for infection - talk with your healthcare provider At routine exams   Tuberculosis Women at increased risk for infection - talk with your healthcare provider Ask your healthcare provider   Vision All women in this age group Ask your healthcare provider   Vaccine Who needs it How often    Chickenpox (varicella) All women in this age group who have no record of this infection or vaccine 2 doses; the second dose should be given at least 4 weeks after the first dose   Hepatitis A Women at increased risk for infection - talk with your healthcare provider 2 doses given at least 6 months apart   Hepatitis B Women at increased risk for infection - talk with your healthcare provider 3 doses over 6 months; second dose should be given 1 month after the first dose; the third dose should be given at least 2 months after the second dose and at least 4 months after the first dose   Haemophilus influenzaeType B (HIB) Women at increased risk for infection - talk with your healthcare provider 1 to 3 doses   Influenza (flu) All women in this age group Once a year   Measles, mumps, rubella (MMR) Women in this age group through their late 50s who have no record of these infections or vaccines 1 dose   Meningococcal Women at increased risk for infection - talk with your healthcare provider 1 or more doses   Pneumococcal conjugate vaccine (PCV13) and pneumococcal polysaccharide vaccine (PPSV23) Women at increased risk for infection - talk with your healthcare provider PCV13: 1 dose ages 19 to 65 (protects against 13 types of pneumococcal bacteria)  PPSV23: 1 to 2 doses through age 64, or 1 dose at 65 or older (protects against 23 types of pneumococcal bacteria)   Tetanus/diphtheria/pertussis (Td/Tdap) booster All women in this age group Td every 10 years, or a one-time dose of Tdap instead of a Td booster after age 18, then Td every 10 years   Zoster All women ages 60 and older 1 dose   Counseling Who needs it How often   BRCA gene mutation testing for breast and ovarian cancer susceptibility Women with increased risk for having gene mutation When your risk is known   Breast cancer and chemoprevention Women at high risk for breast cancer When your risk is known   Diet and exercise Women who are overweight or obese When  diagnosed, and then at routine exams   Sexually transmitted infection prevention Women at increased risk for infection - talk with your healthcare provider At routine exams   Use of daily aspirin Women ages 55 and up in this age group who are at risk for cardiovascular health problems such as stroke When your risk is known   Use of tobacco and the health effects it can cause All women in this age group Every exam   1American [employer]  Date Last Reviewed: 1/26/2016 2000-2018 [employer]. [address] [place] [address], [place] [address]. All rights reserved. This information is not intended as a substitute for professional medical care. Always follow your healthcare professional's instructions.                Follow-ups after your visit        Additional Services     GASTROENTEROLOGY ADULT REF PROCEDURE ONLY       Last Lab Result: Creatinine (mg/dL)       Date                     Value                 06/01/2017               1.02             ----------  Body mass index is 23.82 kg/(m^2).     Needed:  No  Language:  English    Patient will be contacted to schedule procedure.     Please be aware that coverage of these services is subject to the terms and limitations of your health insurance plan.  Call member services at your health plan with any benefit or coverage questions.  Any procedures must be performed at a [facility] facility OR coordinated by your clinic's referral office.    Please bring the following with you to your appointment:    (1) Any X-Rays, CTs or MRIs which have been performed.  Contact the facility where they were done to arrange for  prior to your scheduled appointment.    (2) List of current medications   (3) This referral request   (4) Any documents/labs given to you for this referral                  Future tests that were ordered for you today     Open Future Orders        Priority Expected Expires Ordered    MA Screening Digital Bilateral Routine  11/26/2019  11/26/2018    Lipid Panel Routine  11/26/2019 11/26/2018    Comprehensive Metabolic Panel Routine  11/26/2019 11/26/2018    CBC W PLT No Diff Routine  11/26/2019 11/26/2018    TSH Routine  11/26/2019 11/26/2018            Who to contact     If you have questions or need follow up information about today's clinic visit or your schedule please contact [facility] directly at [phone].  Normal or non-critical lab and imaging results will be communicated to you by MyChart, letter or phone within 4 business days after the clinic has received the results. If you do not hear from us within 7 days, please contact the clinic through MyChart or phone. If you have a critical or abnormal lab result, we will notify you by phone as soon as possible.  Submit refill requests through [employer] or call your pharmacy and they will forward the refill request to us. Please allow 3 business days for your refill to be completed.          Additional Information About Your Visit        Care EveryWhere ID     This is your Care EveryWhere ID. This could be used by other organizations to access your [facility] medical records  [id number]        Your Vitals Were     Pulse Breastfeeding? BMI (Body Mass Index)             64 No 23.82 kg/m2          Blood Pressure from Last 3 Encounters:   11/26/18 134/82   06/01/17 158/90   11/02/15 140/80    Weight from Last 3 Encounters:   11/26/18 127 lb 12.8 oz (58 kg)   06/01/17 125 lb 9.6 oz (57 kg)   11/02/15 136 lb 6.4 oz (61.9 kg)              We Performed the Following     GASTROENTEROLOGY ADULT REF PROCEDURE ONLY     GH IMM-  HC FLU VAC PRESRV FREE QUAD SPLIT VIR 3+YRS IM        Primary Care Provider Office Phone # Fax #    Kika Nathaly Tellez [phone] [address]   [facility] [address]        Equal Access to Services     KADE APODACA AH: John Garcia, waaxda chinqminh, qaybta kaalyamila [place], aletha lozano  lasusan norton. So [facility] [phone].    ATENCIÓN: Si habla madeline, tiene a low disposición servicios gratuitos de asistencia lingüística. Klever al [phone].    We comply with applicable federal civil rights laws and [place] laws. We do not discriminate on the basis of race, color, national origin, age, disability, sex, sexual orientation, or gender identity.            Thank you!     Thank you for choosing [facility]  for your care. Our goal is always to provide you with excellent care. Hearing back from our patients is one way we can continue to improve our services. Please take a few minutes to complete the written survey that you may receive in the mail after your visit with us. Thank you!             Your Updated Medication List - Protect others around you: Learn how to safely use, store and throw away your medicines at www.disposemymeds.org.          This list is accurate as of 11/26/18  9:13 AM.  Always use your most recent med list.                   Brand Name Dispense Instructions for use Diagnosis    MULTI-VITAMINS Tabs      Take 1 tablet by mouth daily        rosuvastatin 10 MG tablet    CRESTOR    30 tablet    Take 1 tablet (10 mg) by mouth daily (with dinner)    Mixed hyperlipidemia       sertraline 100 MG tablet    ZOLOFT    30 tablet    Take 1 tablet (100 mg) by mouth daily    Major depression in remission (H)

## 2018-11-26 NOTE — H&P (VIEW-ONLY)
Nursing Notes:   Georgie Trammell  2018  9:03 AM  Signed  Patient presents to the clinic today for a physical.   Georgie Trammell LPN 2018   8:47 AM    Med rec-complete    SUBJECTIVE:   Lyn Massey is a 57 year old female who presents to clinic today for the following health issues:  Today for annual review, physical and would like lab work.  Her  has convinced her she should have a colonoscopy and I also encouraged her today to have her mammogram this year.  It appears she has not had a mammogram since .  She has no complaints and has been feeling well.  She stays active but continues to smoke and has no current plan to quit.    HPI    Patient Active Problem List   Diagnosis     Dystrophic nail     Factor V Leiden (H)     Hyperlipidemia     Major depression in remission (H)     Tobacco abuse     Past Surgical History:   Procedure Laterality Date     ARTHROSCOPY SHOULDER Right 2008      SECTION      1994       Social History   Substance Use Topics     Smoking status: Current Every Day Smoker     Packs/day: 0.25     Years: 30.00     Types: Cigarettes     Smokeless tobacco: Never Used     Alcohol use No      Comment: Alcoholic Drinks/day: wine 6 glasses per week     Family History   Problem Relation Age of Onset     Other - See Comments Father      Stroke/AAA, kidney disease     Other - See Comments Mother      Blood clots/Factor V Leiden deficiency     Blood Disease Sister      Blood Disease,Factor V leiden deficiency     Blood Disease Sister      Blood Disease,Factor V leiden deficiency     Blood Disease Daughter      Blood Disease,Factor V Leiden deficiency     Eating Disorder Daughter      Eating disorder         Current Outpatient Prescriptions   Medication Sig Dispense Refill     Multiple Vitamin (MULTI-VITAMINS) TABS Take 1 tablet by mouth daily       rosuvastatin (CRESTOR) 10 MG tablet Take 1 tablet (10 mg) by mouth daily (with dinner) 30 tablet 0     sertraline (ZOLOFT) 100  MG tablet Take 1 tablet (100 mg) by mouth daily 30 tablet 0     No Known Allergies    Review of Systems     OBJECTIVE:     /82 (BP Location: Right arm, Patient Position: Sitting, Cuff Size: Adult Regular)  Pulse 64  Wt 127 lb 12.8 oz (58 kg)  Breastfeeding? No  BMI 23.82 kg/m2  Body mass index is 23.82 kg/(m^2).  Physical Exam   Constitutional: She appears well-developed and well-nourished. No distress.   HENT:   Head: Normocephalic.   Right Ear: External ear normal.   Left Ear: External ear normal.   Neck: Normal range of motion. Neck supple. No thyromegaly present.   Cardiovascular: Normal rate, regular rhythm and normal heart sounds.  Exam reveals no gallop.    No murmur heard.  Pulmonary/Chest: Effort normal. No respiratory distress. She has no wheezes. She has no rales.   Lymphadenopathy:     She has no cervical adenopathy.   Neurological: She is alert.   Skin: Skin is warm and dry.   Psychiatric: She has a normal mood and affect.   Nursing note and vitals reviewed.      Diagnostic Test Results: Pending    ASSESSMENT/PLAN:         ICD-10-CM    1. Health maintenance examination Z00.00    2. Mixed hyperlipidemia E78.2 Lipid Panel     Comprehensive Metabolic Panel     CBC W PLT No Diff     TSH     Lipid Panel     Comprehensive Metabolic Panel     CBC W PLT No Diff     TSH   3. Tobacco abuse Z72.0 Lipid Panel     Comprehensive Metabolic Panel     CBC W PLT No Diff     TSH     Lipid Panel     Comprehensive Metabolic Panel     CBC W PLT No Diff     TSH   4. Medication management Z79.899    5. Encounter for screening colonoscopy Z12.11 GASTROENTEROLOGY ADULT REF PROCEDURE ONLY   6. Encounter for immunization Z23  IMM-  HC FLU VAC PRESRV FREE QUAD SPLIT VIR 3+YRS IM   7. Encounter for screening mammogram for breast cancer Z12.31 MA Screening Digital Bilateral     Plan:  Labs pending at time of dictation will be sent to patient.  Influenza vaccine given.  Referral for colonoscopy and mammogram  ordered.  Follow-up encouraged annually and refills provided.  Smoking cessation addressed and she has quit in the past.  Her spouse does not smoke.  She is not at this point motivated to quit.  Kika Tellez MD  [facility]    Portions of this dictation were created using the Dragon Nuance voice recognition system. Proofreading was completed but there may be errors in text.

## 2018-11-26 NOTE — NURSING NOTE
Patient presents to the clinic today for a physical.   Georgie Trammell LPN 11/26/2018   8:47 AM    Med rec-complete

## 2018-11-29 DIAGNOSIS — Z12.11 ENCOUNTER FOR SCREENING COLONOSCOPY: Primary | ICD-10-CM

## 2018-11-29 RX ORDER — BISACODYL 5 MG/1
TABLET, DELAYED RELEASE ORAL
Qty: 2 TABLET | Refills: 0 | Status: ON HOLD | OUTPATIENT
Start: 2018-11-29 | End: 2018-12-10

## 2018-11-29 RX ORDER — POLYETHYLENE GLYCOL 3350, SODIUM CHLORIDE, SODIUM BICARBONATE, POTASSIUM CHLORIDE 420; 11.2; 5.72; 1.48 G/4L; G/4L; G/4L; G/4L
4000 POWDER, FOR SOLUTION ORAL ONCE
Qty: 4000 ML | Refills: 0 | Status: SHIPPED | OUTPATIENT
Start: 2018-11-29 | End: 2018-11-29

## 2018-11-29 NOTE — TELEPHONE ENCOUNTER
Screening Questions for the Scheduling of Screening Colonoscopies   (If Colonoscopy is diagnostic, Provider should review the chart before scheduling.)  Are you younger than 50 or older than 80?  NO   Do you take aspirin or fish oil?  NO (if yes, tell patient to stop 1 week prior to Colonoscopy)  Do you take warfarin (Coumadin), clopidogrel (Plavix), apixaban (Eliquis), dabigatram (Pradaxa), rivaroxaban (Xarelto) or any blood thinner?  NO   Do you use oxygen at home?  NO   Do you have kidney disease?  NO   Are you on dialysis?  NO   Have you had a stroke or heart attack in the last year?   NO   Have you had a stent in your heart or any blood vessel in the last year? NO  Have you had a transplant of any organ? NO   Have you had a colonoscopy or upper endoscopy (EGD) before?  NO         When?    Date of scheduled Colonoscopy. 12/10/2018  Provider  SERNA   Pharmacy  [facility]

## 2018-11-30 DIAGNOSIS — F32.5 MAJOR DEPRESSION IN REMISSION (H): ICD-10-CM

## 2018-11-30 DIAGNOSIS — E78.2 MIXED HYPERLIPIDEMIA: ICD-10-CM

## 2018-11-30 RX ORDER — SERTRALINE HYDROCHLORIDE 100 MG/1
TABLET, FILM COATED ORAL
Qty: 90 TABLET | Refills: 3 | Status: SHIPPED | OUTPATIENT
Start: 2018-11-30 | End: 2020-01-03

## 2018-11-30 RX ORDER — ROSUVASTATIN CALCIUM 10 MG/1
TABLET, COATED ORAL
Qty: 90 TABLET | Refills: 3 | Status: SHIPPED | OUTPATIENT
Start: 2018-11-30 | End: 2020-01-03

## 2018-11-30 NOTE — TELEPHONE ENCOUNTER
"Requested Prescriptions   Pending Prescriptions Disp Refills     rosuvastatin (CRESTOR) 10 MG tablet [Pharmacy Med Name: ROSUVASTATIN 10MG TABLETS] 30 tablet 0     Sig: TAKE 1 TABLET(10 MG) BY MOUTH DAILY WITH DINNER    Statins Protocol Passed    11/30/2018 12:59 PM       Passed - LDL on file in past 12 months    Recent Labs   Lab Test  11/26/18   0923   LDL  154*            Passed - No abnormal creatine kinase in past 12 months    No lab results found.            Passed - Recent (12 mo) or future (30 days) visit within the authorizing provider's specialty    Patient had office visit in the last 12 months or has a visit in the next 30 days with authorizing provider or within the authorizing provider's specialty.  See \"Patient Info\" tab in inbasket, or \"Choose Columns\" in Meds & Orders section of the refill encounter.             Passed - Patient is age 18 or older       Passed - No active pregnancy on record       Passed - No positive pregnancy test in past 12 months        sertraline (ZOLOFT) 100 MG tablet [Pharmacy Med Name: SERTRALINE 100MG TABLETS] 30 tablet 0     Sig: TAKE 1 TABLET(100 MG) BY MOUTH DAILY    SSRIs Protocol Passed    11/30/2018 12:59 PM       Passed - PHQ-9 score less than 5 in past 6 months    Please review last PHQ-9 score.          Passed - Patient is age 18 or older       Passed - No active pregnancy on record       Passed - No positive pregnancy test in last 12 months       Passed - Recent (6 mo) or future (30 days) visit within the authorizing provider's specialty    Patient had office visit in the last 6 months or has a visit in the next 30 days with authorizing provider or within the authorizing provider's specialty.  See \"Patient Info\" tab in inbasket, or \"Choose Columns\" in Meds & Orders section of the refill encounter.            Pt completed annual visit 11/26/18  Including labs. Prescription approved per [facility] Refill Protocol.  Faviola Choi RN on 11/30/2018 at 3:47 PM   "

## 2018-12-03 ENCOUNTER — HOSPITAL ENCOUNTER (OUTPATIENT)
Dept: MAMMOGRAPHY | Facility: OTHER | Age: 58
Discharge: HOME | End: 2018-12-03
Attending: FAMILY MEDICINE | Admitting: FAMILY MEDICINE
Payer: COMMERCIAL

## 2018-12-03 DIAGNOSIS — Z12.31 ENCOUNTER FOR SCREENING MAMMOGRAM FOR BREAST CANCER: ICD-10-CM

## 2018-12-03 PROCEDURE — 77067 SCR MAMMO BI INCL CAD: CPT

## 2018-12-10 ENCOUNTER — HOSPITAL ENCOUNTER (OUTPATIENT)
Facility: OTHER | Age: 58
Discharge: HOME | End: 2018-12-10
Attending: SURGERY | Admitting: SURGERY
Payer: COMMERCIAL

## 2018-12-10 ENCOUNTER — ANESTHESIA (OUTPATIENT)
Dept: SURGERY | Facility: OTHER | Age: 58
End: 2018-12-10
Payer: COMMERCIAL

## 2018-12-10 ENCOUNTER — ANESTHESIA EVENT (OUTPATIENT)
Dept: SURGERY | Facility: OTHER | Age: 58
End: 2018-12-10
Payer: COMMERCIAL

## 2018-12-10 VITALS
OXYGEN SATURATION: 97 % | TEMPERATURE: 96.8 F | RESPIRATION RATE: 16 BRPM | WEIGHT: 121.4 LBS | HEIGHT: 62 IN | SYSTOLIC BLOOD PRESSURE: 161 MMHG | DIASTOLIC BLOOD PRESSURE: 89 MMHG | BODY MASS INDEX: 22.34 KG/M2

## 2018-12-10 PROCEDURE — 88305 TISSUE EXAM BY PATHOLOGIST: CPT | Performed by: SURGERY

## 2018-12-10 PROCEDURE — 45385 COLONOSCOPY W/LESION REMOVAL: CPT | Performed by: NURSE ANESTHETIST, CERTIFIED REGISTERED

## 2018-12-10 PROCEDURE — 25000125 ZZHC RX 250: Performed by: NURSE ANESTHETIST, CERTIFIED REGISTERED

## 2018-12-10 PROCEDURE — 45380 COLONOSCOPY AND BIOPSY: CPT | Mod: PT | Performed by: SURGERY

## 2018-12-10 PROCEDURE — 45380 COLONOSCOPY AND BIOPSY: CPT | Mod: PT,XU | Performed by: SURGERY

## 2018-12-10 PROCEDURE — 45385 COLONOSCOPY W/LESION REMOVAL: CPT | Mod: PT

## 2018-12-10 PROCEDURE — 25000128 H RX IP 250 OP 636: Performed by: SURGERY

## 2018-12-10 PROCEDURE — 45385 COLONOSCOPY W/LESION REMOVAL: CPT | Mod: PT | Performed by: SURGERY

## 2018-12-10 PROCEDURE — 25000128 H RX IP 250 OP 636: Performed by: NURSE ANESTHETIST, CERTIFIED REGISTERED

## 2018-12-10 PROCEDURE — 25000125 ZZHC RX 250: Performed by: SURGERY

## 2018-12-10 RX ORDER — LIDOCAINE 40 MG/G
CREAM TOPICAL
Status: DISCONTINUED | OUTPATIENT
Start: 2018-12-10 | End: 2018-12-10 | Stop reason: HOSPADM

## 2018-12-10 RX ORDER — FLUMAZENIL 0.1 MG/ML
0.2 INJECTION, SOLUTION INTRAVENOUS
Status: DISCONTINUED | OUTPATIENT
Start: 2018-12-10 | End: 2018-12-10 | Stop reason: HOSPADM

## 2018-12-10 RX ORDER — PROPOFOL 10 MG/ML
INJECTION, EMULSION INTRAVENOUS CONTINUOUS PRN
Status: DISCONTINUED | OUTPATIENT
Start: 2018-12-10 | End: 2018-12-10

## 2018-12-10 RX ORDER — ONDANSETRON 2 MG/ML
4 INJECTION INTRAMUSCULAR; INTRAVENOUS
Status: DISCONTINUED | OUTPATIENT
Start: 2018-12-10 | End: 2018-12-10 | Stop reason: HOSPADM

## 2018-12-10 RX ORDER — SODIUM CHLORIDE, SODIUM LACTATE, POTASSIUM CHLORIDE, CALCIUM CHLORIDE 600; 310; 30; 20 MG/100ML; MG/100ML; MG/100ML; MG/100ML
INJECTION, SOLUTION INTRAVENOUS CONTINUOUS
Status: DISCONTINUED | OUTPATIENT
Start: 2018-12-10 | End: 2018-12-10 | Stop reason: HOSPADM

## 2018-12-10 RX ORDER — PROPOFOL 10 MG/ML
INJECTION, EMULSION INTRAVENOUS PRN
Status: DISCONTINUED | OUTPATIENT
Start: 2018-12-10 | End: 2018-12-10

## 2018-12-10 RX ORDER — NALOXONE HYDROCHLORIDE 0.4 MG/ML
.1-.4 INJECTION, SOLUTION INTRAMUSCULAR; INTRAVENOUS; SUBCUTANEOUS
Status: DISCONTINUED | OUTPATIENT
Start: 2018-12-10 | End: 2018-12-10 | Stop reason: HOSPADM

## 2018-12-10 RX ORDER — LIDOCAINE HYDROCHLORIDE 20 MG/ML
INJECTION, SOLUTION INFILTRATION; PERINEURAL PRN
Status: DISCONTINUED | OUTPATIENT
Start: 2018-12-10 | End: 2018-12-10

## 2018-12-10 RX ADMIN — PROPOFOL 70 MG: 10 INJECTION, EMULSION INTRAVENOUS at 09:40

## 2018-12-10 RX ADMIN — SODIUM CHLORIDE, SODIUM LACTATE, POTASSIUM CHLORIDE, AND CALCIUM CHLORIDE: 600; 310; 30; 20 INJECTION, SOLUTION INTRAVENOUS at 09:32

## 2018-12-10 RX ADMIN — PROPOFOL 135 MCG/KG/MIN: 10 INJECTION, EMULSION INTRAVENOUS at 09:38

## 2018-12-10 RX ADMIN — PROPOFOL 70 MG: 10 INJECTION, EMULSION INTRAVENOUS at 09:38

## 2018-12-10 RX ADMIN — LIDOCAINE HYDROCHLORIDE 60 MG: 20 INJECTION, SOLUTION INFILTRATION; PERINEURAL at 09:38

## 2018-12-10 ASSESSMENT — MIFFLIN-ST. JEOR: SCORE: 1088.92

## 2018-12-10 ASSESSMENT — LIFESTYLE VARIABLES: TOBACCO_USE: 1

## 2018-12-10 NOTE — ANESTHESIA CARE TRANSFER NOTE
Patient: Lyn Massey    Procedure(s):  COMBINED COLONOSCOPY, SINGLE OR MULTIPLE BIOPSY/POLYPECTOMY BY BIOPSY    Diagnosis: screening  Diagnosis Additional Information: No value filed.    Anesthesia Type:   MAC     Note:  Airway :Room Air  Patient transferred to:Phase II  Handoff Report: Identifed the Patient, Identified the Reponsible Provider, Reviewed the pertinent medical history, Discussed the surgical course, Reviewed Intra-OP anesthesia mangement and issues during anesthesia, Set expectations for post-procedure period and Allowed opportunity for questions and acknowledgement of understanding      Vitals: (Last set prior to Anesthesia Care Transfer)    CRNA VITALS  12/10/2018 0940 - 12/10/2018 1017      12/10/2018             Ht Rate:  60    Resp Rate (set):  10                Electronically Signed By: LAURA CHUNG CRNA  December 10, 2018  10:17 AM

## 2018-12-10 NOTE — ANESTHESIA PREPROCEDURE EVALUATION
Anesthesia Pre-Procedure Evaluation    Patient: Lyn Massey   MRN: [id number] : 1960          Preoperative Diagnosis: screening    Procedure(s):  COLONOSCOPY    Past Medical History:   Diagnosis Date     Closed fracture of phalanx of finger     ,Index finger and laceration     Personal history of other medical treatment (CODE)     [id number] spontaneous first trimester losses     Past Surgical History:   Procedure Laterality Date     ARTHROSCOPY SHOULDER Right 2008      SECTION             Anesthesia Evaluation     . Pt has had prior anesthetic.            ROS/MED HX    ENT/Pulmonary:     (+)tobacco use, Current use .25 packs/day  , . .    Neurologic:  - neg neurologic ROS     Cardiovascular:  - neg cardiovascular ROS       METS/Exercise Tolerance:  >4 METS   Hematologic:  - neg hematologic  ROS       Musculoskeletal:  - neg musculoskeletal ROS       GI/Hepatic: Comment: occassional minor heartburn    (+) GERD       Renal/Genitourinary:  - ROS Renal section negative       Endo:  - neg endo ROS       Psychiatric:  - neg psychiatric ROS       Infectious Disease:  - neg infectious disease ROS       Malignancy:      - no malignancy   Other:    - neg other ROS                      Physical Exam  Normal systems: dental    Airway   Mallampati: II  TM distance: >3 FB  Neck ROM: full    Dental     Cardiovascular   Rhythm and rate: regular and normal      Pulmonary    breath sounds clear to auscultation            Lab Results   Component Value Date    WBC 7.4 2018    HGB 15.7 2018    HCT 47.1 (H) 2018     2018     2018    POTASSIUM 4.5 2018    CHLORIDE 102 2018    CO2 29 2018    BUN 19 2018    CR 0.93 2018     (H) 2018    JOSE DE JESUS 9.6 2018    ALBUMIN 4.7 2018    PROTTOTAL 7.7 2018    ALT 20 2018    AST 25 2018    ALKPHOS 85 2018    BILITOTAL 0.4 2018       Preop Vitals  BP  "Readings from Last 3 Encounters:   11/26/18 134/82   06/01/17 158/90   11/02/15 140/80    Pulse Readings from Last 3 Encounters:   11/26/18 64   06/01/17 60   11/02/15 60      Resp Readings from Last 3 Encounters:   09/15/15 12    SpO2 Readings from Last 3 Encounters:   No data found for SpO2      Temp Readings from Last 1 Encounters:   No data found for Temp    Ht Readings from Last 1 Encounters:   06/01/17 1.56 m (5' 1.42\")      Wt Readings from Last 1 Encounters:   11/26/18 58 kg (127 lb 12.8 oz)    Estimated body mass index is 23.82 kg/m  as calculated from the following:    Height as of 6/1/17: 1.56 m (5' 1.42\").    Weight as of 11/26/18: 58 kg (127 lb 12.8 oz).       Anesthesia Plan      History & Physical Review      ASA Status:  2 .    NPO Status:  > 6 hours    Plan for MAC with Propofol induction.          Postoperative Care      Consents  Anesthetic plan, risks, benefits and alternatives discussed with:  Patient..                 LAURA BROWER CRNA  "

## 2018-12-10 NOTE — DISCHARGE INSTRUCTIONS
[facility]   Adult Discharge Orders & Instructions     For 12 hours after surgery    1. Get plenty of rest.  A responsible adult must stay with you for at least 12 hours after you leave the hospital.   2. Do not drive or use heavy equipment.  If you have weakness or tingling, don't drive or use heavy equipment until this feeling goes away.  3. Do not drink alcohol.  4. Avoid strenuous or risky activities.  Ask for help when climbing stairs.   5. You may feel lightheaded.  IF so, sit for a few minutes before standing.  Have someone help you get up.   6. If you have nausea (feel sick to your stomach): Drink only clear liquids such as apple juice, ginger ale, broth or 7-Up.  Rest may also help.  Be sure to drink enough fluids.  Move to a regular diet as you feel able.  7. You may have a slight fever. Call the doctor if your fever is over 101 F (38.3 C) (taken under the tongue) or lasts longer than 24 hours.  8. You may have a dry mouth, a sore throat, muscle aches or trouble sleeping.  These should go away after 24 hours.  9. Do not make important or legal decisions.   Call your doctor for any of the followin.  Signs of infection (fever, growing tenderness at the surgery site, a large amount of drainage or bleeding, severe pain, foul-smelling drainage, redness, swelling).    2. It has been over 8 to 10 hours since surgery and you are still not able to urinate (pass water).    3.  Headache for over 24 hours.    4.  Numbness, tingling or weakness the day after surgery (if you had spinal anesthesia).  To contact a doctor, call _________[phone]_______________________

## 2018-12-10 NOTE — INTERVAL H&P NOTE
I saw and examined Lyn Massey.  I have reviewed the history and physical and find no changes to the patient's medical status or condition with the exceptions noted below.     Will Hutchison   9:31 AM 12/10/2018

## 2018-12-10 NOTE — ANESTHESIA POSTPROCEDURE EVALUATION
Patient: Lyn Massey    Procedure(s):  COMBINED COLONOSCOPY, SINGLE OR MULTIPLE BIOPSY/POLYPECTOMY BY BIOPSY    Diagnosis:screening  Diagnosis Additional Information: No value filed.    Anesthesia Type:  MAC    Note:  Anesthesia Post Evaluation    Patient location during evaluation: Phase 2  Patient participation: Able to fully participate in evaluation  Level of consciousness: awake  Pain management: adequate  Airway patency: patent  Cardiovascular status: acceptable  Respiratory status: acceptable  Hydration status: balanced  PONV: none             Last vitals:  Vitals:    12/10/18 0919 12/10/18 1015   BP: 161/89    Resp: 16 16   Temp: 96.8  F (36  C) 96.8  F (36  C)   SpO2: 97%          Electronically Signed By: LAURA BROWER CRNA  December 10, 2018  10:30 AM

## 2018-12-13 DIAGNOSIS — K52.831 COLLAGENOUS COLITIS: Primary | ICD-10-CM

## 2018-12-13 RX ORDER — BUDESONIDE 3 MG/1
9 CAPSULE, COATED PELLETS ORAL EVERY MORNING
Qty: 189 CAPSULE | Refills: 0 | Status: SHIPPED | OUTPATIENT
Start: 2018-12-13 | End: 2020-01-16

## 2019-01-06 ENCOUNTER — HOSPITAL ENCOUNTER (EMERGENCY)
Facility: OTHER | Age: 59
Discharge: HOME | End: 2019-01-06
Attending: PHYSICIAN ASSISTANT | Admitting: PHYSICIAN ASSISTANT
Payer: COMMERCIAL

## 2019-01-06 ENCOUNTER — APPOINTMENT (OUTPATIENT)
Dept: GENERAL RADIOLOGY | Facility: OTHER | Age: 59
End: 2019-01-06
Payer: COMMERCIAL

## 2019-01-06 VITALS
OXYGEN SATURATION: 97 % | RESPIRATION RATE: 14 BRPM | TEMPERATURE: 98 F | BODY MASS INDEX: 23 KG/M2 | SYSTOLIC BLOOD PRESSURE: 175 MMHG | WEIGHT: 125 LBS | DIASTOLIC BLOOD PRESSURE: 74 MMHG | HEIGHT: 62 IN

## 2019-01-06 DIAGNOSIS — M84.464A PATHOLOGICAL FRACTURE OF LEFT FIBULA: ICD-10-CM

## 2019-01-06 PROCEDURE — 73610 X-RAY EXAM OF ANKLE: CPT | Mod: LT

## 2019-01-06 PROCEDURE — 29515 APPLICATION SHORT LEG SPLINT: CPT | Performed by: PHYSICIAN ASSISTANT

## 2019-01-06 PROCEDURE — 29515 APPLICATION SHORT LEG SPLINT: CPT | Mod: Z6 | Performed by: PHYSICIAN ASSISTANT

## 2019-01-06 PROCEDURE — 73562 X-RAY EXAM OF KNEE 3: CPT | Mod: LT

## 2019-01-06 PROCEDURE — 99284 EMERGENCY DEPT VISIT MOD MDM: CPT | Mod: 25 | Performed by: PHYSICIAN ASSISTANT

## 2019-01-06 PROCEDURE — 99283 EMERGENCY DEPT VISIT LOW MDM: CPT | Mod: Z6 | Performed by: PHYSICIAN ASSISTANT

## 2019-01-06 PROCEDURE — 25000132 ZZH RX MED GY IP 250 OP 250 PS 637: Performed by: PHYSICIAN ASSISTANT

## 2019-01-06 RX ORDER — OXYCODONE AND ACETAMINOPHEN 5; 325 MG/1; MG/1
1 TABLET ORAL EVERY 6 HOURS PRN
Qty: 12 TABLET | Refills: 0 | Status: SHIPPED | OUTPATIENT
Start: 2019-01-06 | End: 2019-01-09

## 2019-01-06 RX ORDER — OXYCODONE AND ACETAMINOPHEN 5; 325 MG/1; MG/1
1 TABLET ORAL ONCE
Status: COMPLETED | OUTPATIENT
Start: 2019-01-06 | End: 2019-01-06

## 2019-01-06 RX ADMIN — OXYCODONE HYDROCHLORIDE AND ACETAMINOPHEN 1 TABLET: 5; 325 TABLET ORAL at 13:46

## 2019-01-06 ASSESSMENT — MIFFLIN-ST. JEOR: SCORE: 1100.25

## 2019-01-06 NOTE — DISCHARGE INSTRUCTIONS
Get plenty of fluids and rest.  Take your medication as prescribed.  You should be nonweightbearing.  When sitting down try to prop your foot up to help with swelling.  Call make an appointment with your orthopedic specialist tomorrow.  Return to the ED if symptoms worsen.

## 2019-01-06 NOTE — ED TRIAGE NOTES
"ED Nursing Triage Note (General)   ________________________________    Lyn Massey is a 58 year old Female that presents to triage private car  With history of  Cross country skiing and fell causing a twisting in her left leg and now complaining of left knee and ankle pain reported by patient   Significant symptoms had onset 1.5 hour(s) ago.  /74   Temp 98  F (36.7  C) (Tympanic)   Resp 14   Ht 1.575 m (5' 2\")   Wt 56.7 kg (125 lb)   SpO2 97%   Breastfeeding? No   BMI 22.86 kg/m  t  Patient appears alert , in mild distress., and cooperative behavior.    GCS Total = 15  Airway: intact  Breathing noted as Normal.  Circulation Normal  Skin normal  Action taken:  Triage order initiated      PRE HOSPITAL PRIOR LIVING SITUATION Spouse  "

## 2019-01-06 NOTE — ED AVS SNAPSHOT
[facility]  [address]  [place] [address]  Phone:  [phone]  Fax:  [phone]                                    Lyn Massey   MRN: [id number]    Department:  [facility]   Date of Visit:  1/6/2019           After Visit Summary Signature Page    I have received my discharge instructions, and my questions have been answered. I have discussed any challenges I see with this plan with the nurse or doctor.    ..........................................................................................................................................  Patient/Patient Representative Signature      ..........................................................................................................................................  Patient Representative Print Name and Relationship to Patient    ..................................................               ................................................  Date                                   Time    ..........................................................................................................................................  Reviewed by Signature/Title    ...................................................              ..............................................  Date                                               Time          22EPIC Rev 08/18

## 2019-01-10 NOTE — ED PROVIDER NOTES
History     Chief Complaint   Patient presents with     Trauma     HPI  Lyn Massey is a 58 year old female who presents to the ED today with a chief complaint of left leg pain.  Patient reports that she was cross-country skiing when she lost her balance and had sudden onset of left lower leg pain.  Patient reports difficulty bearing weight.  Patient reports no other injuries and has no other complaints.    Problem List:    Patient Active Problem List    Diagnosis Date Noted     Factor V Leiden (H) 2018     Priority: Medium     Overview:   Leiden deficiency, heterozygote       Hyperlipidemia 2018     Priority: Medium     Tobacco abuse 2018     Priority: Medium     Major depression in remission (H) 2014     Priority: Medium     Dystrophic nail 2012     Priority: Medium     Overview:   Left index finger after a injury          Past Medical History:    Past Medical History:   Diagnosis Date     Closed fracture of phalanx of finger      Personal history of other medical treatment (CODE)        Past Surgical History:    Past Surgical History:   Procedure Laterality Date     ARTHROSCOPY SHOULDER Right 2008      SECTION           COLONOSCOPY N/A 12/10/2018    Collegenous colitis.  1 tubular adenoma, follow up 5 years       Family History:    Family History   Problem Relation Age of Onset     Other - See Comments Father         Stroke/AAA, kidney disease     Other - See Comments Mother         Blood clots/Factor V Leiden deficiency     Blood Disease Sister         Blood Disease,Factor V leiden deficiency     Blood Disease Sister         Blood Disease,Factor V leiden deficiency     Blood Disease Daughter         Blood Disease,Factor V Leiden deficiency     Eating Disorder Daughter         Eating disorder       Social History:  Marital Status:   [2]  Social History     Tobacco Use     Smoking status: Current Every Day Smoker     Packs/day: 0.25     Years: 30.00     Pack  "years: 7.50     Types: Cigarettes     Smokeless tobacco: Never Used   Substance Use Topics     Alcohol use: Yes     Alcohol/week: 0.0 oz     Comment: Alcoholic Drinks/day: wine 6 glasses per week     Drug use: No        Medications:      Multiple Vitamin (MULTI-VITAMINS) TABS   rosuvastatin (CRESTOR) 10 MG tablet   sertraline (ZOLOFT) 100 MG tablet   budesonide (ENTOCORT EC) 3 MG EC capsule         Review of Systems   Musculoskeletal:        Left lower leg pain       Physical Exam   BP: 175/74  Heart Rate: 80  Temp: 98  F (36.7  C)  Resp: 14  Height: 157.5 cm (5' 2\")  Weight: 56.7 kg (125 lb)  SpO2: 97 %      Physical Exam   Constitutional: She appears well-developed and well-nourished. No distress.   HENT:   Head: Normocephalic and atraumatic.   Eyes: Conjunctivae are normal. No scleral icterus.   Neck: Neck supple.   Cardiovascular: Normal rate and regular rhythm.   Pulmonary/Chest: Effort normal.   Abdominal: Soft. There is no tenderness.   Musculoskeletal: She exhibits edema and tenderness. She exhibits no deformity.   Lymphadenopathy:     She has no cervical adenopathy.   Neurological: She is alert.   Skin: Skin is warm and dry. No rash noted. She is not diaphoretic.   Psychiatric: She has a normal mood and affect.       ED Course        Procedures               Critical Care time:  none               No results found for this or any previous visit (from the past 24 hour(s)).    Medications   oxyCODONE-acetaminophen (PERCOCET) 5-325 MG per tablet 1 tablet (1 tablet Oral Given 1/6/19 1346)       Assessments & Plan (with Medical Decision Making)   Patient seen and examined.  Patient is nontoxic-appearing no acute distress.  Heart, lung, bowel sounds normal.  Patient had ecchymosis and slight swelling around the left ankle area.  Tenderness to palpation lateral side of ankle.  X-ray revealed a minimally displaced oblique fracture of the distal fibula.  [facility].  Recommend the patient be " placed in a splint with close follow-up for possible surgical procedure.  Patient was placed in a posterior leg and stirrup splint.  Large amount of padding applied.  Patient is to be nonweightbearing and placed in crutches.  Patient tolerated the procedure well.  Patient did report that she has used a different orthopedic group in the past and that she would like to follow-up with them.  I feel this is appropriate.  Strict return precautions given.  Patient understood and agreed to plan patient was discharged.    Luis Chen PA-C  I have reviewed the nursing notes.    I have reviewed the findings, diagnosis, plan and need for follow up with the patient.          Medication List      ASK your doctor about these medications    oxyCODONE-acetaminophen 5-325 MG tablet  Commonly known as:  PERCOCET  1 tablet, Oral, EVERY 6 HOURS PRN  Ask about: Should I take this medication?            Final diagnoses:   Pathological fracture of left fibula       1/6/2019   [facility]     Luis Chen PA  01/10/19 3202

## 2020-01-02 DIAGNOSIS — F32.5 MAJOR DEPRESSION IN REMISSION (H): ICD-10-CM

## 2020-01-02 DIAGNOSIS — E78.2 MIXED HYPERLIPIDEMIA: ICD-10-CM

## 2020-01-03 RX ORDER — SERTRALINE HYDROCHLORIDE 100 MG/1
TABLET, FILM COATED ORAL
Qty: 90 TABLET | Refills: 0 | Status: SHIPPED | OUTPATIENT
Start: 2020-01-03 | End: 2020-03-20

## 2020-01-03 RX ORDER — ROSUVASTATIN CALCIUM 10 MG/1
TABLET, COATED ORAL
Qty: 90 TABLET | Refills: 0 | Status: SHIPPED | OUTPATIENT
Start: 2020-01-03 | End: 2020-03-20

## 2020-01-03 NOTE — TELEPHONE ENCOUNTER
One-time refill was completed today.  Please notify patient she is due for follow-up in clinic for annual checkup, labs and to establish care with a new primary provider.LAURA Burris CNP on 1/3/2020 at 10:00 AM

## 2020-01-03 NOTE — TELEPHONE ENCOUNTER
Attempted to reach patient, got VM of patient's first and last name. Left details of below and provided appointment line number to make appointment to establish care.  Mauro Azevedo LPN,..............1/3/2020 11:14 AM

## 2020-01-03 NOTE — TELEPHONE ENCOUNTER
[id number]  sent Rx request for the following:       Last Office Visit:              11/26/2018  Future Office visit:          none        ----------------------     Disp Refills Start End WENCESLAO   rosuvastatin (CRESTOR) 10 MG tablet 90 tablet 3 11/30/2018  No   Sig: TAKE 1 TABLET(10 MG) BY MOUTH DAILY WITH DINNER       Routing refill request to provider for review/approval because:  Statins Protocol Failed1/3 9:34 AM   LDL on file in past 12 months    Recent (12 mo) or future (30 days) visit within the authorizing provider's specialty     ---------------------     Disp Refills Start End WENCESLAO   sertraline (ZOLOFT) 100 MG tablet 90 tablet 3 11/30/2018  No   Sig: TAKE 1 TABLET(100 MG) BY MOUTH DAILY       Routing refill request to provider for review/approval because:  SSRIs Protocol Failed1/2 3:56 AM   PHQ-9 score less than 5 in past 6 months    Recent (6 mo) or future (30 days) visit within the authorizing provider's specialty     Attempted to call patient to schedule appt.-not availiable. Pt is overdue to be seen.    Routed to teamlet for consideration.  Jocelyne Arceo RN .............. 1/3/2020  9:41 AM

## 2020-01-16 ENCOUNTER — OFFICE VISIT (OUTPATIENT)
Dept: FAMILY MEDICINE | Facility: OTHER | Age: 60
End: 2020-01-16
Attending: FAMILY MEDICINE
Payer: COMMERCIAL

## 2020-01-16 VITALS
OXYGEN SATURATION: 97 % | WEIGHT: 126.6 LBS | SYSTOLIC BLOOD PRESSURE: 161 MMHG | DIASTOLIC BLOOD PRESSURE: 78 MMHG | HEIGHT: 61 IN | TEMPERATURE: 96 F | BODY MASS INDEX: 23.9 KG/M2 | HEART RATE: 59 BPM | RESPIRATION RATE: 20 BRPM

## 2020-01-16 DIAGNOSIS — Z23 ENCOUNTER FOR IMMUNIZATION: ICD-10-CM

## 2020-01-16 DIAGNOSIS — G89.29 CHRONIC RIGHT SHOULDER PAIN: ICD-10-CM

## 2020-01-16 DIAGNOSIS — Z72.0 TOBACCO ABUSE: ICD-10-CM

## 2020-01-16 DIAGNOSIS — Z53.20 HIV SCREENING DECLINED: ICD-10-CM

## 2020-01-16 DIAGNOSIS — Z53.20 MAMMOGRAM DECLINED: ICD-10-CM

## 2020-01-16 DIAGNOSIS — Z00.00 ROUTINE GENERAL MEDICAL EXAMINATION AT A HEALTH CARE FACILITY: Primary | ICD-10-CM

## 2020-01-16 DIAGNOSIS — M75.111 NONTRAUMATIC INCOMPLETE TEAR OF RIGHT ROTATOR CUFF: ICD-10-CM

## 2020-01-16 DIAGNOSIS — M25.511 CHRONIC RIGHT SHOULDER PAIN: ICD-10-CM

## 2020-01-16 DIAGNOSIS — I10 BENIGN ESSENTIAL HYPERTENSION: ICD-10-CM

## 2020-01-16 DIAGNOSIS — E78.2 MIXED HYPERLIPIDEMIA: ICD-10-CM

## 2020-01-16 DIAGNOSIS — M89.9 DISORDER OF BONE AND CARTILAGE: ICD-10-CM

## 2020-01-16 DIAGNOSIS — M94.9 DISORDER OF BONE AND CARTILAGE: ICD-10-CM

## 2020-01-16 DIAGNOSIS — M67.819 TENDINOSIS OF ROTATOR CUFF: ICD-10-CM

## 2020-01-16 LAB
ALBUMIN SERPL-MCNC: 4.9 G/DL (ref 3.5–5.7)
ALP SERPL-CCNC: 87 U/L (ref 34–104)
ALT SERPL W P-5'-P-CCNC: 29 U/L (ref 7–52)
ANION GAP SERPL CALCULATED.3IONS-SCNC: 10 MMOL/L (ref 3–14)
AST SERPL W P-5'-P-CCNC: 31 U/L (ref 13–39)
BASOPHILS # BLD AUTO: 0.1 10E9/L (ref 0–0.2)
BASOPHILS NFR BLD AUTO: 0.6 %
BILIRUB SERPL-MCNC: 0.5 MG/DL (ref 0.3–1)
BUN SERPL-MCNC: 18 MG/DL (ref 7–25)
CALCIUM SERPL-MCNC: 10.1 MG/DL (ref 8.6–10.3)
CHLORIDE SERPL-SCNC: 101 MMOL/L (ref 98–107)
CHOLEST SERPL-MCNC: 247 MG/DL
CO2 SERPL-SCNC: 26 MMOL/L (ref 21–31)
CREAT SERPL-MCNC: 0.95 MG/DL (ref 0.6–1.2)
DIFFERENTIAL METHOD BLD: ABNORMAL
EOSINOPHIL # BLD AUTO: 0.1 10E9/L (ref 0–0.7)
EOSINOPHIL NFR BLD AUTO: 1.3 %
ERYTHROCYTE [DISTWIDTH] IN BLOOD BY AUTOMATED COUNT: 13 % (ref 10–15)
GFR SERPL CREATININE-BSD FRML MDRD: 60 ML/MIN/{1.73_M2}
GLUCOSE SERPL-MCNC: 105 MG/DL (ref 70–105)
HCT VFR BLD AUTO: 49 % (ref 35–47)
HDLC SERPL-MCNC: 72 MG/DL (ref 23–92)
HGB BLD-MCNC: 16.1 G/DL (ref 11.7–15.7)
IMM GRANULOCYTES # BLD: 0 10E9/L (ref 0–0.4)
IMM GRANULOCYTES NFR BLD: 0.3 %
LDLC SERPL CALC-MCNC: 156 MG/DL
LYMPHOCYTES # BLD AUTO: 2 10E9/L (ref 0.8–5.3)
LYMPHOCYTES NFR BLD AUTO: 22.7 %
MCH RBC QN AUTO: 31 PG (ref 26.5–33)
MCHC RBC AUTO-ENTMCNC: 32.9 G/DL (ref 31.5–36.5)
MCV RBC AUTO: 94 FL (ref 78–100)
MONOCYTES # BLD AUTO: 0.6 10E9/L (ref 0–1.3)
MONOCYTES NFR BLD AUTO: 6.9 %
NEUTROPHILS # BLD AUTO: 5.9 10E9/L (ref 1.6–8.3)
NEUTROPHILS NFR BLD AUTO: 68.2 %
NONHDLC SERPL-MCNC: 175 MG/DL
PLATELET # BLD AUTO: 305 10E9/L (ref 150–450)
POTASSIUM SERPL-SCNC: 4.3 MMOL/L (ref 3.5–5.1)
PROT SERPL-MCNC: 7.8 G/DL (ref 6.4–8.9)
RBC # BLD AUTO: 5.19 10E12/L (ref 3.8–5.2)
SODIUM SERPL-SCNC: 137 MMOL/L (ref 134–144)
TRIGL SERPL-MCNC: 97 MG/DL
WBC # BLD AUTO: 8.7 10E9/L (ref 4–11)

## 2020-01-16 PROCEDURE — 90732 PPSV23 VACC 2 YRS+ SUBQ/IM: CPT | Performed by: FAMILY MEDICINE

## 2020-01-16 PROCEDURE — 80061 LIPID PANEL: CPT | Mod: ZL | Performed by: FAMILY MEDICINE

## 2020-01-16 PROCEDURE — 99396 PREV VISIT EST AGE 40-64: CPT | Performed by: FAMILY MEDICINE

## 2020-01-16 PROCEDURE — 36415 COLL VENOUS BLD VENIPUNCTURE: CPT | Mod: ZL | Performed by: FAMILY MEDICINE

## 2020-01-16 PROCEDURE — 85025 COMPLETE CBC W/AUTO DIFF WBC: CPT | Mod: ZL | Performed by: FAMILY MEDICINE

## 2020-01-16 PROCEDURE — 90471 IMMUNIZATION ADMIN: CPT | Performed by: FAMILY MEDICINE

## 2020-01-16 PROCEDURE — 80053 COMPREHEN METABOLIC PANEL: CPT | Mod: ZL | Performed by: FAMILY MEDICINE

## 2020-01-16 RX ORDER — CHLORTHALIDONE 25 MG/1
25 TABLET ORAL DAILY
Qty: 30 TABLET | Refills: 1 | Status: SHIPPED | OUTPATIENT
Start: 2020-01-16 | End: 2020-03-20

## 2020-01-16 ASSESSMENT — ENCOUNTER SYMPTOMS
RHINORRHEA: 0
PALPITATIONS: 0
ANAL BLEEDING: 0
HEADACHES: 0
HEMATURIA: 0
FEVER: 0
SORE THROAT: 0
COUGH: 0
WEAKNESS: 0
ABDOMINAL PAIN: 0
CHILLS: 0
HEMATOCHEZIA: 0
SHORTNESS OF BREATH: 0
NAUSEA: 0
DYSPHORIC MOOD: 0
CONSTIPATION: 0
DIARRHEA: 0
DYSURIA: 0
NERVOUS/ANXIOUS: 0
VOMITING: 0

## 2020-01-16 ASSESSMENT — PATIENT HEALTH QUESTIONNAIRE - PHQ9: SUM OF ALL RESPONSES TO PHQ QUESTIONS 1-9: 1

## 2020-01-16 ASSESSMENT — MIFFLIN-ST. JEOR: SCORE: 1083.88

## 2020-01-16 ASSESSMENT — PAIN SCALES - GENERAL: PAINLEVEL: MODERATE PAIN (5)

## 2020-01-16 NOTE — NURSING NOTE
"Chief Complaint   Patient presents with     Physical     Establish care     Patient stated she would like to discuss pain in her right shoulder and back.   Initial BP (!) 150/82 (BP Location: Right arm, Patient Position: Sitting, Cuff Size: Adult Regular)   Pulse 64   Temp 96  F (35.6  C) (Tympanic)   Resp 20   Ht 1.545 m (5' 0.83\")   Wt 57.4 kg (126 lb 9.6 oz)   LMP  (LMP Unknown)   SpO2 97%   Breastfeeding No   BMI 24.06 kg/m   Estimated body mass index is 24.06 kg/m  as calculated from the following:    Height as of this encounter: 1.545 m (5' 0.83\").    Weight as of this encounter: 57.4 kg (126 lb 9.6 oz).  Medication Reconciliation: complete    Red Fong LPN  "

## 2020-01-16 NOTE — PROGRESS NOTES
SUBJECTIVE:   Lyn Massey is a 59 year old female who presents to clinic today for the following health issues:    HPI      Past Medical History:   Diagnosis Date     Closed fracture of phalanx of finger     ,Index finger and laceration     Personal history of other medical treatment (CODE)     [id number] spontaneous first trimester losses      Past Surgical History:   Procedure Laterality Date     ARTHROSCOPY SHOULDER Right 2008      SECTION           COLONOSCOPY N/A 12/10/2018    Collegenous colitis.  1 tubular adenoma, follow up 5 years     Family History   Problem Relation Age of Onset     Other - See Comments Father         Stroke/AAA, kidney disease     Other - See Comments Mother         Blood clots/Factor V Leiden deficiency     Blood Disease Sister         Blood Disease,Factor V leiden deficiency     Blood Disease Sister         Blood Disease,Factor V leiden deficiency     Blood Disease Daughter         Blood Disease,Factor V Leiden deficiency     Eating Disorder Daughter         Eating disorder     Social History     Tobacco Use     Smoking status: Current Every Day Smoker     Packs/day: 0.25     Years: 30.00     Pack years: 7.50     Types: Cigarettes     Smokeless tobacco: Never Used   Substance Use Topics     Alcohol use: Yes     Alcohol/week: 0.0 standard drinks     Comment: Moderate/socially      Current Outpatient Medications   Medication Sig Dispense Refill     chlorthalidone (HYGROTON) 25 MG tablet Take 1 tablet (25 mg) by mouth daily 30 tablet 1     Multiple Vitamin (MULTI-VITAMINS) TABS Take 1 tablet by mouth daily       nicotine (NICODERM CQ) 7 MG/24HR 24 hr patch Place 1 patch onto the skin every 24 hours 14 patch 1     rosuvastatin (CRESTOR) 10 MG tablet TAKE 1 TABLET(10 MG) BY MOUTH DAILY WITH DINNER 90 tablet 0     sertraline (ZOLOFT) 100 MG tablet TAKE 1 TABLET(100 MG) BY MOUTH DAILY 90 tablet 0     No Known Allergies    Review of Systems   Constitutional: Negative for  "chills and fever.   HENT: Negative for congestion, ear pain, rhinorrhea and sore throat.    Eyes: Negative for visual disturbance.   Respiratory: Negative for cough and shortness of breath.    Cardiovascular: Negative for chest pain and palpitations.   Gastrointestinal: Negative for abdominal pain, anal bleeding, constipation, diarrhea, hematochezia, nausea and vomiting.   Genitourinary: Negative for dysuria, hematuria and vaginal bleeding.   Neurological: Negative for weakness and headaches.   Psychiatric/Behavioral: Negative for dysphoric mood. The patient is not nervous/anxious.       OBJECTIVE:     BP (!) 150/82 (BP Location: Right arm, Patient Position: Sitting, Cuff Size: Adult Regular)   Pulse 64   Temp 96  F (35.6  C) (Tympanic)   Resp 20   Ht 1.545 m (5' 0.83\")   Wt 57.4 kg (126 lb 9.6 oz)   LMP  (LMP Unknown)   SpO2 97%   Breastfeeding No   BMI 24.06 kg/m    Body mass index is 24.06 kg/m .  Physical Exam  Constitutional:       General: She is not in acute distress.     Appearance: Normal appearance. She is not ill-appearing.   HENT:      Right Ear: Tympanic membrane normal.      Left Ear: There is impacted cerumen.      Nose: Nose normal.      Mouth/Throat:      Mouth: Mucous membranes are moist.      Pharynx: Oropharynx is clear. No oropharyngeal exudate or posterior oropharyngeal erythema.   Eyes:      Extraocular Movements: Extraocular movements intact.      Pupils: Pupils are equal, round, and reactive to light.   Neck:      Musculoskeletal: Neck supple.   Cardiovascular:      Rate and Rhythm: Normal rate and regular rhythm.      Heart sounds: No murmur. No friction rub. No gallop.    Pulmonary:      Effort: Pulmonary effort is normal.      Breath sounds: Normal breath sounds. No wheezing, rhonchi or rales.      Comments: Prolonged expiratory phase.  Abdominal:      General: Bowel sounds are normal.      Palpations: Abdomen is soft.      Tenderness: There is no abdominal tenderness. There is " no guarding or rebound.   Musculoskeletal:      Comments: Tenderness to palpation of AC joint.  PROM restricted to 130 degrees in flexion, to 90 degrees in abduction, and to 30 degrees in extension.  Normal AROM.  Neer and Hawkin's tests negative.  Positive lift off test.   Lymphadenopathy:      Cervical: No cervical adenopathy.   Neurological:      Mental Status: She is alert.      Motor: No weakness.   Psychiatric:         Mood and Affect: Mood normal.       Diagnostic Test Results:  Labs reviewed in Epic    ASSESSMENT/PLAN:       DO [facility]

## 2020-01-16 NOTE — LETTER
My Depression Action Plan  Name: Lyn Massey   Date of Birth 1960  Date: 1/16/2020    My doctor: No Ref-Primary, Physician   My clinic: [facility]  [address]  [place] [id number] [phone]          GREEN    ZONE   Good Control    What it looks like:     Things are going generally well. You have normal ups and downs. You may even feel depressed from time to time, but bad moods usually last less than a day.   What you need to do:  1. Continue to care for yourself (see self care plan)  2. Check your depression survival kit and update it as needed  3. Follow your physician s recommendations including any medication.  4. Do not stop taking medication unless you consult with your physician first.           YELLOW         ZONE Getting Worse    What it looks like:     Depression is starting to interfere with your life.     It may be hard to get out of bed; you may be starting to isolate yourself from others.    Symptoms of depression are starting to last most all day and this has happened for several days.     You may have suicidal thoughts but they are not constant.   What you need to do:     1. Call your care team. Your response to treatment will improve if you keep your care team informed of your progress. Yellow periods are signs an adjustment may need to be made.     2. Continue your self-care.  Just get dressed and ready for the day.  Don't give yourself time to talk yourself out of it.    3. Talk to someone in your support network.    4. Open up your Depression Self-Care Plan/Wellness Kit.           RED    ZONE Medical Alert - Get Help    What it looks like:     Depression is seriously interfering with your life.     You may experience these or other symptoms: You can t get out of bed most days, can t work or engage in other necessary activities, you have trouble taking care of basic hygiene, or basic responsibilities, thoughts of suicide or death that  will not go away, self-injurious behavior.     What you need to do:  1. Call your care team and request a same-day appointment. If they are not available (weekends or after hours) call your local crisis line, emergency room or 911.            Depression Self-Care Plan / Wellness Kit    Self-Care for Depression  Here s the deal. Your body and mind are really not as separate as most people think.  What you do and think affects how you feel and how you feel influences what you do and think. This means if you do things that people who feel good do, it will help you feel better.  Sometimes this is all it takes.  There is also a place for medication and therapy depending on how severe your depression is, so be sure to consult with your medical provider and/ or Behavioral Health Consultant if your symptoms are worsening or not improving.     In order to better manage my stress, I will:    Exercise  Get some form of exercise, every day. This will help reduce pain and release endorphins, the  feel good  chemicals in your brain. This is almost as good as taking antidepressants!  This is not the same as joining a gym and then never going! (they count on that by the way ) It can be as simple as just going for a walk or doing some gardening, anything that will get you moving.      Hygiene   Maintain good hygiene (get out of bed in the morning, make your bed, brush your teeth, take a shower, and get dressed like you were going to work, even if you are unemployed).  If your clothes don't fit try to get ones that do.    Diet  Strive to eat foods that are good for me, drink plenty of water, and avoid excessive sugar, caffeine, alcohol, and other mood-altering substances.  Some foods that are helpful in depression are: complex carbohydrates, B vitamins, flaxseed, fish or fish oil, fresh fruits and vegetables.    Psychotherapy  Agree to participate in Individual Therapy (if recommended).    Medication  If prescribed medications, I  agree to take them.  Missing doses can result in serious side effects.  I understand that drinking alcohol, or other illicit drug use, may cause potential side effects.  I will not stop my medication abruptly without first discussing it with my provider.    Staying Connected With Others  Stay in touch with my friends, family members, and my primary care provider/team.    Use your imagination  Be creative.  We all have a creative side; it doesn t matter if it s oil painting, sand castles, or mud pies! This will also kick up the endorphins.    Witness Beauty  (AKA stop and smell the roses) Take a look outside, even in mid-winter. Notice colors, textures. Watch the squirrels and birds.     Service to others  Be of service to others.  There is always someone else in need.  By helping others we can  get out of ourselves  and remember the really important things.  This also provides opportunities for practicing all the other parts of the program.    Humor  Laugh and be silly!  Adjust your TV habits for less news and crime-drama and more comedy.    Control your stress  Try breathing deep, massage therapy, biofeedback, and meditation. Find time to relax each day.     Crisis Text Line  http://www.crisistextline.org    The Crisis Text Line serves anyone, in any type of crisis, providing access to free, 24/7 support and information via the medium people already use and trust:    Here's how it works:  1.  Text 483-314 from anywhere in the [place], anytime, about any type of crisis.  2.  A live, trained Crisis Counselor receives the text and responds quickly.  3.  The volunteer Crisis Counselor will help you move from a 'hot moment to a cool moment'.    My support system    Clinic Contact:  Phone number:    Contact 1:  Phone number:    Contact 2:  Phone number:    [facility]/:  Phone number:    Therapist:  Phone number:    Local crisis center:    Phone number:    Other community support:  Phone number:

## 2020-01-16 NOTE — PROGRESS NOTES
" SUBJECTIVE:   CC: Lyn Massey is an 59 year old woman who presents for preventive health visit.     Healthy Habits:    Do you get at least three servings of calcium containing foods daily (dairy, green leafy vegetables, etc.)? yes    Amount of exercise or daily activities, outside of work: 2 hour(s) per day    Problems taking medications regularly No    Medication side effects: No    Have you had an eye exam in the past two years? no    Do you see a dentist twice per year? no    Do you have sleep apnea, excessive snoring or daytime drowsiness?no    Chronic Right Shoulder Pain: Has had symptoms for many years.  She is participated in physical therapy and has been treated with massage and by chiropractors in the past without significant improvement.  She has previously been evaluated by orthopedics and had a procedure done to have a bone spur \"shaved\" and states that her bursa was \"heated\" at the same time.  This did not improve her symptoms.  She reports the pain is located beneath her shoulder blade and that it is \"stabbing\" in character.  She has some radiation into her right upper extremity and some numbness and tingling.  Pain is worse with movements.  She also reports stiffness and decreased range of motion.  She does have a history of frozen shoulder.    Hypertension:  Does not check BP at home.  Reports no known history of high blood pressures.  She does have a positive family history in her mother.  Denies chest pain, vision changes, and headaches.    Depression: Symptoms presented with menopause and included emotional lability, physical aching pain, and just generally \"not feeling like [herself.]\"  Reports that the symptoms are all well controlled on Sertraline without side effects or complications.    Hyperlipidemia:  Taking Rosuvastatin 10 mg daily as prescribed.  No side effects or complications.    Tobacco Abuse:  Currently smoking 0.25 PPD.  Has smoked for the past 30 years.  She is interested in " trying the Nicotine patch to aid in cessation.    Abuse: Current or Past(Physical, Sexual or Emotional)- No  Do you feel safe in your environment? Yes    Have you ever done Advance Care Planning? (For example, a Health Directive, POLST, or a discussion with a medical provider or your loved ones about your wishes): No, advance care planning information given to patient to review.  Patient plans to discuss their wishes with loved ones or provider.      Social History     Tobacco Use     Smoking status: Current Every Day Smoker     Packs/day: 0.25     Years: 30.00     Pack years: 7.50     Types: Cigarettes     Smokeless tobacco: Never Used   Substance Use Topics     Alcohol use: Yes     Alcohol/week: 0.0 standard drinks     Comment: Moderate/socially      If you drink alcohol do you typically have >3 drinks per day or >7 drinks per week? No                     Reviewed orders with patient.  Reviewed health maintenance and updated orders accordingly - Yes  Lab work is in process    Mammo discussed, not appropriate for or declined by this patient.    Pertinent mammograms are reviewed under the imaging tab.  History of abnormal Pap smear: Last 3 Pap and HPV Results:       Reviewed and updated as needed this visit by clinical staff  Tobacco  Allergies  Meds  Problems  Med Hx  Surg Hx  Fam Hx  Soc Hx        Reviewed and updated as needed this visit by Provider  Tobacco  Allergies  Meds  Problems  Med Hx  Surg Hx  Fam Hx        Past Medical History:   Diagnosis Date     Closed fracture of phalanx of finger     ,Index finger and laceration     Personal history of other medical treatment (CODE)     [id number] spontaneous first trimester losses      Past Surgical History:   Procedure Laterality Date     ARTHROSCOPY SHOULDER Right 2008      SECTION           COLONOSCOPY N/A 12/10/2018    Collegenous colitis.  1 tubular adenoma, follow up 5 years     OB History    Para Term  AB Living  "  3 1 0 0 2 1   SAB TAB Ectopic Multiple Live Births   0 0 0 0 0     ROS:  CONSTITUTIONAL: NEGATIVE for fever, chills, change in weight  EYES: NEGATIVE for vision changes or irritation  ENT: NEGATIVE for ear, mouth and throat problems  RESP: NEGATIVE for significant cough or SOB  BREAST: NEGATIVE for masses, tenderness or discharge  CV: NEGATIVE for chest pain, palpitations or peripheral edema  GI: NEGATIVE for nausea, abdominal pain, heartburn, or change in bowel habits  : NEGATIVE for unusual urinary or vaginal symptoms. No vaginal bleeding.  MUSCULOSKELETAL: NEGATIVE for significant arthralgias or myalgia  NEURO: NEGATIVE for weakness, dizziness or paresthesias  PSYCHIATRIC: NEGATIVE for changes in mood or affect     OBJECTIVE:   BP (!) 161/78   Pulse 59   Temp 96  F (35.6  C) (Tympanic)   Resp 20   Ht 1.545 m (5' 0.83\")   Wt 57.4 kg (126 lb 9.6 oz)   LMP  (LMP Unknown)   SpO2 97%   Breastfeeding No   BMI 24.06 kg/m    Physical Exam  Constitutional:       General: She is not in acute distress.     Appearance: Normal appearance. She is not ill-appearing.   HENT:      Right Ear: Tympanic membrane normal.      Left Ear: There is impacted cerumen.      Nose: Nose normal.      Mouth/Throat:      Mouth: Mucous membranes are moist.      Pharynx: Oropharynx is clear. No oropharyngeal exudate or posterior oropharyngeal erythema.   Eyes:      Extraocular Movements: Extraocular movements intact.      Pupils: Pupils are equal, round, and reactive to light.   Neck:      Musculoskeletal: Neck supple.   Cardiovascular:      Rate and Rhythm: Normal rate and regular rhythm.      Heart sounds: No murmur. No friction rub. No gallop.    Pulmonary:      Effort: Pulmonary effort is normal.      Breath sounds: Normal breath sounds. No wheezing, rhonchi or rales.      Comments: Prolonged expiratory phase.  Abdominal:      General: Bowel sounds are normal.      Palpations: Abdomen is soft.      Tenderness: There is no " abdominal tenderness. There is no guarding or rebound.   Musculoskeletal:      Comments: Tenderness to palpation of AC joint.  PROM restricted to 130 degrees in flexion, to 90 degrees in abduction, and to 30 degrees in extension.  Normal AROM.  Neer and Hawkin's tests negative.  Positive lift off test.   Lymphadenopathy:      Cervical: No cervical adenopathy.   Neurological:      Mental Status: She is alert.      Motor: No weakness.   Psychiatric:         Mood and Affect: Mood normal.     Diagnostic Test Results:  Labs reviewed in Epic    ASSESSMENT/PLAN:   1. Routine general medical examination at a health care facility  No daily ASA use.  BP elevated beyond goal < 130/80.  Increase Chlorthalidone to 25 mg daily.  Encouraged monitoring at home and log keeping for review on follow-up.  Thiazide monitoring updated today.  Moderate-intensity statin therapy.  Recheck lipid panel.  Cervical cancer screening current and appropriate.  Due for breast cancer screening.  Declined.  Colon cancer screening current and appropriate.  Bone density screening ordered due to risk factors.  Current every day smoker but not in benefit group for lung cancer screening.  Tobacco cessation counseling.  She would like to try Nicotine patches.  Immunizations updated today.  Depression well-controlled.  Healthy diet and exercise discussed.    2. Benign essential hypertension  Medication adjusted as above.  Encouraged at home monitoring.  Follow-up for reassessment.  - chlorthalidone (HYGROTON) 25 MG tablet; Take 1 tablet (25 mg) by mouth daily  Dispense: 30 tablet; Refill: 1  - CBC and Differential  - Comprehensive Metabolic Panel    3. Mixed hyperlipidemia  Continue Rosuvastatin 10 mg daily.  Recheck lipid panel for continued monitoring.  - Lipid Panel    4. Chronic right shoulder pain  Previous records reviewed.  She has a significant history of shoulder issues.  Reassess with MRI as it has been 5-6 years since last imaging.  Follow-up  "pending results.  - MR Shoulder Right w/o Contrast; Future    5. Mammogram declined  Discussed risks/benefits.  Mammogram declined.  Reassess in future.    6. Disorder of bone and cartilage  At risk for osteopenia/osteoporosis.  DXA scan ordered.  - DX Hip/Pelvis/Spine; Future    7. HIV screening declined  Discussed risks/benefits.  HIV screening declined.  She is low-risk.    8. Tobacco abuse  - nicotine (NICODERM CQ) 7 MG/24HR 24 hr patch; Place 1 patch onto the skin every 24 hours  Dispense: 14 patch; Refill: 1    9. Encounter for immunization  - VACCINE ADMINISTRATION, INITIAL  - Pneumococcal vaccine 23 valent PPSV23  (Pneumovax) [[id number]]    COUNSELING:   Reviewed preventive health counseling, as reflected in patient instructions       Regular exercise       Healthy diet/nutrition       Vision screening       Hearing screening       Immunizations       Colon cancer screening       Consider Hep C screening for patients born between 1945 and 1965       HIV screeninx in teen years, 1x in adult years, and at intervals if high risk    Estimated body mass index is 24.06 kg/m  as calculated from the following:    Height as of this encounter: 1.545 m (5' 0.83\").    Weight as of this encounter: 57.4 kg (126 lb 9.6 oz).     reports that she has been smoking cigarettes. She has a 7.50 pack-year smoking history. She has never used smokeless tobacco.  Tobacco Cessation Action Plan: Pharmacotherapies : Nicotine patch    Counseling Resources:  ATP IV Guidelines  Pooled Cohorts Equation Calculator  Breast Cancer Risk Calculator  FRAX Risk Assessment  ICSI Preventive Guidelines  Dietary Guidelines for Americans, 2010  USDA's MyPlate  ASA Prophylaxis  Lung CA Screening    Divina Bertrand DO  [facility]  "

## 2020-01-20 ENCOUNTER — HOSPITAL ENCOUNTER (OUTPATIENT)
Dept: MRI IMAGING | Facility: OTHER | Age: 60
Discharge: HOME | End: 2020-01-20
Attending: FAMILY MEDICINE | Admitting: FAMILY MEDICINE
Payer: COMMERCIAL

## 2020-01-20 DIAGNOSIS — M25.511 CHRONIC RIGHT SHOULDER PAIN: ICD-10-CM

## 2020-01-20 DIAGNOSIS — G89.29 CHRONIC RIGHT SHOULDER PAIN: ICD-10-CM

## 2020-01-20 PROCEDURE — 73221 MRI JOINT UPR EXTREM W/O DYE: CPT | Mod: RT

## 2020-01-24 NOTE — RESULT ENCOUNTER NOTE
MRI reveals partial thickness tears of two tendons in there shoulder and decrease in size of cyst along one of the muscles in her shoulder.  If she would like to discuss options other than conservative treatment, she should see an orthopedic doctor.  Is there a physician or practice she would prefer?

## 2020-02-12 ENCOUNTER — HOSPITAL ENCOUNTER (OUTPATIENT)
Dept: BONE DENSITY | Facility: OTHER | Age: 60
Discharge: HOME | End: 2020-02-12
Attending: FAMILY MEDICINE | Admitting: FAMILY MEDICINE
Payer: COMMERCIAL

## 2020-02-12 DIAGNOSIS — M94.9 DISORDER OF BONE AND CARTILAGE: ICD-10-CM

## 2020-02-12 DIAGNOSIS — M89.9 DISORDER OF BONE AND CARTILAGE: ICD-10-CM

## 2020-02-12 PROCEDURE — 77080 DXA BONE DENSITY AXIAL: CPT

## 2020-02-19 ENCOUNTER — OFFICE VISIT (OUTPATIENT)
Dept: FAMILY MEDICINE | Facility: OTHER | Age: 60
End: 2020-02-19
Attending: FAMILY MEDICINE
Payer: COMMERCIAL

## 2020-02-19 VITALS
RESPIRATION RATE: 16 BRPM | OXYGEN SATURATION: 95 % | BODY MASS INDEX: 23.26 KG/M2 | WEIGHT: 123.2 LBS | TEMPERATURE: 97.4 F | DIASTOLIC BLOOD PRESSURE: 72 MMHG | HEART RATE: 68 BPM | SYSTOLIC BLOOD PRESSURE: 134 MMHG | HEIGHT: 61 IN

## 2020-02-19 DIAGNOSIS — E78.2 MIXED HYPERLIPIDEMIA: ICD-10-CM

## 2020-02-19 DIAGNOSIS — I10 BENIGN ESSENTIAL HYPERTENSION: Primary | ICD-10-CM

## 2020-02-19 DIAGNOSIS — M85.80 OSTEOPENIA, UNSPECIFIED LOCATION: ICD-10-CM

## 2020-02-19 LAB
ANION GAP SERPL CALCULATED.3IONS-SCNC: 10 MMOL/L (ref 3–14)
BUN SERPL-MCNC: 19 MG/DL (ref 7–25)
CALCIUM SERPL-MCNC: 10 MG/DL (ref 8.6–10.3)
CHLORIDE SERPL-SCNC: 94 MMOL/L (ref 98–107)
CO2 SERPL-SCNC: 32 MMOL/L (ref 21–31)
CREAT SERPL-MCNC: 1.04 MG/DL (ref 0.6–1.2)
GFR SERPL CREATININE-BSD FRML MDRD: 54 ML/MIN/{1.73_M2}
GLUCOSE SERPL-MCNC: 109 MG/DL (ref 70–105)
POTASSIUM SERPL-SCNC: 3.1 MMOL/L (ref 3.5–5.1)
SODIUM SERPL-SCNC: 136 MMOL/L (ref 134–144)

## 2020-02-19 PROCEDURE — 36415 COLL VENOUS BLD VENIPUNCTURE: CPT | Mod: ZL | Performed by: FAMILY MEDICINE

## 2020-02-19 PROCEDURE — 80048 BASIC METABOLIC PNL TOTAL CA: CPT | Mod: ZL | Performed by: FAMILY MEDICINE

## 2020-02-19 PROCEDURE — 99214 OFFICE O/P EST MOD 30 MIN: CPT | Performed by: FAMILY MEDICINE

## 2020-02-19 ASSESSMENT — ENCOUNTER SYMPTOMS
CHILLS: 0
FEVER: 0
DIZZINESS: 0
LIGHT-HEADEDNESS: 0

## 2020-02-19 ASSESSMENT — PAIN SCALES - GENERAL: PAINLEVEL: MODERATE PAIN (4)

## 2020-02-19 ASSESSMENT — MIFFLIN-ST. JEOR: SCORE: 1071.21

## 2020-02-19 NOTE — NURSING NOTE
"Chief Complaint   Patient presents with     Hypertension         Initial /72   Pulse 68   Temp 97.4  F (36.3  C) (Temporal)   Resp 16   Ht 1.549 m (5' 1\")   Wt 55.9 kg (123 lb 3.2 oz)   LMP  (LMP Unknown)   SpO2 95%   BMI 23.28 kg/m   Estimated body mass index is 23.28 kg/m  as calculated from the following:    Height as of this encounter: 1.549 m (5' 1\").    Weight as of this encounter: 55.9 kg (123 lb 3.2 oz).    Medication Reconciliation: complete      Norma J. Gosselin, LPN  "

## 2020-03-02 ENCOUNTER — TELEPHONE (OUTPATIENT)
Dept: FAMILY MEDICINE | Facility: OTHER | Age: 60
End: 2020-03-02

## 2020-03-02 NOTE — TELEPHONE ENCOUNTER
She will have her BMP lab done in the [facility].  Norma J. Gosselin, LPN .......  3/2/2020  4:17 PM

## 2020-03-02 NOTE — TELEPHONE ENCOUNTER
Dr. Bertrand-Pt in the [facility] and wondering if ok to have labwork done at [place] in the [facility]. I see orders for cbc but pt thought there was more to done. Please call. Thank you.  aMddy Macedo

## 2020-03-12 DIAGNOSIS — I10 BENIGN ESSENTIAL HYPERTENSION: ICD-10-CM

## 2020-03-17 NOTE — TELEPHONE ENCOUNTER
Routing refill request to provider for review/approval because:  Labs out of range:      Evie Perkins RN on 3/17/2020 at 10:39 AM

## 2020-03-19 DIAGNOSIS — E78.2 MIXED HYPERLIPIDEMIA: ICD-10-CM

## 2020-03-19 DIAGNOSIS — F32.5 MAJOR DEPRESSION IN REMISSION (H): ICD-10-CM

## 2020-03-20 RX ORDER — ROSUVASTATIN CALCIUM 20 MG/1
20 TABLET, COATED ORAL DAILY
Qty: 90 TABLET | Refills: 3 | Status: SHIPPED | OUTPATIENT
Start: 2020-03-20 | End: 2021-05-20

## 2020-03-20 RX ORDER — CHLORTHALIDONE 25 MG/1
TABLET ORAL
Qty: 30 TABLET | Refills: 0 | Status: SHIPPED | OUTPATIENT
Start: 2020-03-20 | End: 2020-05-27

## 2020-03-20 RX ORDER — SERTRALINE HYDROCHLORIDE 100 MG/1
TABLET, FILM COATED ORAL
Qty: 90 TABLET | Refills: 3 | Status: SHIPPED | OUTPATIENT
Start: 2020-03-20 | End: 2021-05-20

## 2020-03-20 NOTE — TELEPHONE ENCOUNTER
Notified patient of providers note.  Loreto Kahn LPN ....................  3/20/2020   10:05 AM

## 2020-03-20 NOTE — TELEPHONE ENCOUNTER
"Routing refill request to provider for review/approval because:    Per LOV: 2/19/2020  \"  Increase Rosuvastatin to 20 mg daily\"  This is updated on request   Will route to Dr. Bertrand for review and consideration.  Wandy Brito RN on 3/20/2020 at 2:29 PM          "

## 2020-04-30 ENCOUNTER — TELEPHONE (OUTPATIENT)
Dept: FAMILY MEDICINE | Facility: OTHER | Age: 60
End: 2020-04-30

## 2020-04-30 NOTE — TELEPHONE ENCOUNTER
Patient does not know why we we calling so I will send this to the nurse that called her.  Norma J. Gosselin, LPN .......  4/30/2020  12:44 PM

## 2020-05-26 DIAGNOSIS — I10 BENIGN ESSENTIAL HYPERTENSION: ICD-10-CM

## 2020-05-26 LAB
ANION GAP SERPL CALCULATED.3IONS-SCNC: 13 MMOL/L (ref 3–14)
BUN SERPL-MCNC: 17 MG/DL (ref 7–25)
CALCIUM SERPL-MCNC: 9.6 MG/DL (ref 8.6–10.3)
CHLORIDE SERPL-SCNC: 98 MMOL/L (ref 98–107)
CO2 SERPL-SCNC: 28 MMOL/L (ref 21–31)
CREAT SERPL-MCNC: 0.92 MG/DL (ref 0.6–1.2)
GFR SERPL CREATININE-BSD FRML MDRD: 62 ML/MIN/{1.73_M2}
GLUCOSE SERPL-MCNC: 142 MG/DL (ref 70–105)
POTASSIUM SERPL-SCNC: 3.2 MMOL/L (ref 3.5–5.1)
SODIUM SERPL-SCNC: 139 MMOL/L (ref 134–144)

## 2020-05-26 PROCEDURE — 36415 COLL VENOUS BLD VENIPUNCTURE: CPT | Mod: ZL | Performed by: FAMILY MEDICINE

## 2020-05-26 PROCEDURE — 80048 BASIC METABOLIC PNL TOTAL CA: CPT | Mod: ZL | Performed by: FAMILY MEDICINE

## 2020-08-16 DIAGNOSIS — I10 BENIGN ESSENTIAL HYPERTENSION: Primary | ICD-10-CM

## 2020-08-17 NOTE — TELEPHONE ENCOUNTER
[facility] sent Rx request for the following:      Requested Prescriptions   Pending Prescriptions Disp Refills   chlorthalidone (HYGROTON) 25 MG tablet [Pharmacy Med Name: CHLORTHALIDONE 25MG TABLETS] 30 tablet 2    Sig: TAKE 1 TABLET(25 MG) BY MOUTH DAILY   Last Prescription Date:    5/27/20  Last Fill Qty/Refills:         30, R-2    Last Office Visit:              2/19/20  Future Office visit:           None  Routing refill request to provider for review/approval because:   Diuretics (Including Combos) Protocol Failed - 8/17/2020  2:23 PM       Failed - Normal serum potassium on file in past 12 months     Per chart review, Pt needed BMP prior done prior to refills. BMP completed 5/26/20.    Unable to complete prescription refill per RN Medication Refill Policy. Ruchi Pretty RN .............. 8/17/2020  2:27 PM

## 2020-08-18 RX ORDER — CHLORTHALIDONE 25 MG/1
TABLET ORAL
Qty: 90 TABLET | Refills: 1 | Status: SHIPPED | OUTPATIENT
Start: 2020-08-18 | End: 2021-02-18

## 2020-11-06 ENCOUNTER — HOSPITAL ENCOUNTER (OUTPATIENT)
Facility: OTHER | Age: 60
End: 2020-11-06
Attending: ORTHOPAEDIC SURGERY | Admitting: ORTHOPAEDIC SURGERY
Payer: COMMERCIAL

## 2021-02-17 DIAGNOSIS — I10 BENIGN ESSENTIAL HYPERTENSION: Primary | ICD-10-CM

## 2021-02-17 NOTE — LETTER
February 18, 2021      Lyn Massey  [address] [place] [address]        Dear yLn,     This letter is to remind you that you are due for your annual exam with Divina Bertrand. Your last comprehensive visit was more than 12 months ago.    A LIMITED refill of chlorthalidone (HYCROTON) 25 mg tablet has been called into your pharmacy. Additional refills require you to complete this appointment.    Please call the clinic at [phone] to schedule your appointment.    If you should require additional refills before your scheduled appointment, please contact your pharmacy and we will refill your medication until the date of your appointment.    Thank you for choosing [facility] and [facility] for your health care needs.    Sincerely,    Refill ZACHERY  [facility]

## 2021-02-18 RX ORDER — CHLORTHALIDONE 25 MG/1
TABLET ORAL
Qty: 90 TABLET | Refills: 0 | Status: SHIPPED | OUTPATIENT
Start: 2021-02-18 | End: 2021-05-20

## 2021-02-18 NOTE — TELEPHONE ENCOUNTER
[facility] sent Rx request for the following:      Requested Prescriptions   Pending Prescriptions Disp Refills   chlorthalidone (HYGROTON) 25 MG tablet [Pharmacy Med Name: CHLORTHALIDONE 25MG TABLETS] 90 tablet 1    Sig: TAKE 1 TABLET(25 MG) BY MOUTH DAILY   Last Prescription Date:   8/18/20  Last Fill Qty/Refills:         90, R-1    Last Office Visit:              2/19/20  Future Office visit:           None     Diuretics (Including Combos) Protocol Failed - 2/18/2021  4:04 PM    Failed - Normal serum potassium on file in past 12 months     Patient is due for annual exam. Reminder letter sent to Pt. Prescription approved per [facility] Refill Protocol for 90-day everette refill. Ruchi Pretty RN .............. 2/18/2021  4:07 PM

## 2021-05-20 ENCOUNTER — OFFICE VISIT (OUTPATIENT)
Dept: FAMILY MEDICINE | Facility: OTHER | Age: 61
End: 2021-05-20
Attending: FAMILY MEDICINE
Payer: COMMERCIAL

## 2021-05-20 VITALS
HEIGHT: 62 IN | TEMPERATURE: 97.8 F | RESPIRATION RATE: 16 BRPM | BODY MASS INDEX: 21.79 KG/M2 | HEART RATE: 58 BPM | SYSTOLIC BLOOD PRESSURE: 124 MMHG | OXYGEN SATURATION: 97 % | DIASTOLIC BLOOD PRESSURE: 82 MMHG | WEIGHT: 118.4 LBS

## 2021-05-20 DIAGNOSIS — F32.5 MAJOR DEPRESSION IN REMISSION (H): ICD-10-CM

## 2021-05-20 DIAGNOSIS — Z72.0 TOBACCO ABUSE: ICD-10-CM

## 2021-05-20 DIAGNOSIS — Z00.00 ROUTINE GENERAL MEDICAL EXAMINATION AT A HEALTH CARE FACILITY: Primary | ICD-10-CM

## 2021-05-20 DIAGNOSIS — Z12.4 SCREENING FOR MALIGNANT NEOPLASM OF CERVIX: ICD-10-CM

## 2021-05-20 DIAGNOSIS — I10 BENIGN ESSENTIAL HYPERTENSION: ICD-10-CM

## 2021-05-20 DIAGNOSIS — E78.2 MIXED HYPERLIPIDEMIA: ICD-10-CM

## 2021-05-20 DIAGNOSIS — Z11.4 ENCOUNTER FOR SCREENING FOR HIV: ICD-10-CM

## 2021-05-20 DIAGNOSIS — Z12.31 VISIT FOR SCREENING MAMMOGRAM: ICD-10-CM

## 2021-05-20 LAB
ALBUMIN SERPL-MCNC: 4.6 G/DL (ref 3.5–5.7)
ALP SERPL-CCNC: 84 U/L (ref 34–104)
ALT SERPL W P-5'-P-CCNC: 26 U/L (ref 7–52)
ANION GAP SERPL CALCULATED.3IONS-SCNC: 10 MMOL/L (ref 3–14)
AST SERPL W P-5'-P-CCNC: 27 U/L (ref 13–39)
BASOPHILS # BLD AUTO: 0.1 10E9/L (ref 0–0.2)
BASOPHILS NFR BLD AUTO: 0.7 %
BILIRUB SERPL-MCNC: 0.7 MG/DL (ref 0.3–1)
BUN SERPL-MCNC: 16 MG/DL (ref 7–25)
CALCIUM SERPL-MCNC: 9.5 MG/DL (ref 8.6–10.3)
CHLORIDE SERPL-SCNC: 98 MMOL/L (ref 98–107)
CHOLEST SERPL-MCNC: 252 MG/DL
CO2 SERPL-SCNC: 28 MMOL/L (ref 21–31)
CREAT SERPL-MCNC: 0.89 MG/DL (ref 0.6–1.2)
DIFFERENTIAL METHOD BLD: ABNORMAL
EOSINOPHIL # BLD AUTO: 0.1 10E9/L (ref 0–0.7)
EOSINOPHIL NFR BLD AUTO: 1.4 %
ERYTHROCYTE [DISTWIDTH] IN BLOOD BY AUTOMATED COUNT: 12.7 % (ref 10–15)
GFR SERPL CREATININE-BSD FRML MDRD: 65 ML/MIN/{1.73_M2}
GLUCOSE SERPL-MCNC: 95 MG/DL (ref 70–105)
HCT VFR BLD AUTO: 45.5 % (ref 35–47)
HDLC SERPL-MCNC: 73 MG/DL (ref 23–92)
HGB BLD-MCNC: 16 G/DL (ref 11.7–15.7)
IMM GRANULOCYTES # BLD: 0 10E9/L (ref 0–0.4)
IMM GRANULOCYTES NFR BLD: 0.2 %
LDLC SERPL CALC-MCNC: 144 MG/DL
LYMPHOCYTES # BLD AUTO: 2.1 10E9/L (ref 0.8–5.3)
LYMPHOCYTES NFR BLD AUTO: 25.8 %
MCH RBC QN AUTO: 32.5 PG (ref 26.5–33)
MCHC RBC AUTO-ENTMCNC: 35.2 G/DL (ref 31.5–36.5)
MCV RBC AUTO: 93 FL (ref 78–100)
MONOCYTES # BLD AUTO: 0.6 10E9/L (ref 0–1.3)
MONOCYTES NFR BLD AUTO: 7.1 %
NEUTROPHILS # BLD AUTO: 5.4 10E9/L (ref 1.6–8.3)
NEUTROPHILS NFR BLD AUTO: 64.8 %
NONHDLC SERPL-MCNC: 179 MG/DL
PLATELET # BLD AUTO: 274 10E9/L (ref 150–450)
POTASSIUM SERPL-SCNC: 3.5 MMOL/L (ref 3.5–5.1)
PROT SERPL-MCNC: 7.7 G/DL (ref 6.4–8.9)
RBC # BLD AUTO: 4.92 10E12/L (ref 3.8–5.2)
SODIUM SERPL-SCNC: 136 MMOL/L (ref 134–144)
TRIGL SERPL-MCNC: 174 MG/DL
WBC # BLD AUTO: 8.3 10E9/L (ref 4–11)

## 2021-05-20 PROCEDURE — 87624 HPV HI-RISK TYP POOLED RSLT: CPT | Mod: ZL | Performed by: FAMILY MEDICINE

## 2021-05-20 PROCEDURE — 36415 COLL VENOUS BLD VENIPUNCTURE: CPT | Mod: ZL | Performed by: FAMILY MEDICINE

## 2021-05-20 PROCEDURE — 87389 HIV-1 AG W/HIV-1&-2 AB AG IA: CPT | Mod: ZL | Performed by: FAMILY MEDICINE

## 2021-05-20 PROCEDURE — 80061 LIPID PANEL: CPT | Mod: ZL | Performed by: FAMILY MEDICINE

## 2021-05-20 PROCEDURE — 99396 PREV VISIT EST AGE 40-64: CPT | Performed by: FAMILY MEDICINE

## 2021-05-20 PROCEDURE — G0123 SCREEN CERV/VAG THIN LAYER: HCPCS | Performed by: FAMILY MEDICINE

## 2021-05-20 PROCEDURE — 85025 COMPLETE CBC W/AUTO DIFF WBC: CPT | Mod: ZL | Performed by: FAMILY MEDICINE

## 2021-05-20 PROCEDURE — 80053 COMPREHEN METABOLIC PANEL: CPT | Mod: ZL | Performed by: FAMILY MEDICINE

## 2021-05-20 RX ORDER — CHLORTHALIDONE 25 MG/1
25 TABLET ORAL DAILY
Qty: 90 TABLET | Refills: 3 | Status: SHIPPED | OUTPATIENT
Start: 2021-05-20 | End: 2022-05-02

## 2021-05-20 RX ORDER — SERTRALINE HYDROCHLORIDE 100 MG/1
100 TABLET, FILM COATED ORAL DAILY
Qty: 90 TABLET | Refills: 3 | Status: SHIPPED | OUTPATIENT
Start: 2021-05-20 | End: 2022-05-02

## 2021-05-20 RX ORDER — ROSUVASTATIN CALCIUM 20 MG/1
20 TABLET, COATED ORAL DAILY
Qty: 90 TABLET | Refills: 3 | Status: SHIPPED | OUTPATIENT
Start: 2021-05-20 | End: 2022-05-02

## 2021-05-20 ASSESSMENT — PATIENT HEALTH QUESTIONNAIRE - PHQ9: SUM OF ALL RESPONSES TO PHQ QUESTIONS 1-9: 0

## 2021-05-20 ASSESSMENT — MIFFLIN-ST. JEOR: SCORE: 1057.31

## 2021-05-20 ASSESSMENT — PAIN SCALES - GENERAL: PAINLEVEL: NO PAIN (0)

## 2021-05-20 NOTE — NURSING NOTE
HEARING FREQUENCY    Right Ear:      1000 Hz RESPONSE- on Level: 25 db (Conditioning sound)   1000 Hz: RESPONSE- on Level: 25 db   2000 Hz: RESPONSE- on Level: 25 db   4000 Hz: RESPONSE- on Level: tone not heard    Left Ear:      4000 Hz: RESPONSE- on Level: 40 db   2000 Hz: RESPONSE- on Level: 25 db   1000 Hz: RESPONSE- on Level:   20 db     500 Hz: RESPONSE- on Level:   20 db     Right Ear:    500 Hz: RESPONSE- on Level: 25 db

## 2021-05-20 NOTE — NURSING NOTE
"Patient presents to the clinic today for a physical.   Georgie Trammell LPN 5/20/2021   8:02 AM    Chief Complaint   Patient presents with     Physical       Initial /82 (BP Location: Right arm, Patient Position: Sitting, Cuff Size: Adult Regular)   Pulse 58   Temp 97.8  F (36.6  C) (Temporal)   Resp 16   Ht 1.57 m (5' 1.81\")   Wt 53.7 kg (118 lb 6.4 oz)   LMP  (LMP Unknown)   SpO2 97%   Breastfeeding No   BMI 21.79 kg/m   Estimated body mass index is 21.79 kg/m  as calculated from the following:    Height as of this encounter: 1.57 m (5' 1.81\").    Weight as of this encounter: 53.7 kg (118 lb 6.4 oz).  Medication Reconciliation: complete  Georgie Trammell LPN    "

## 2021-05-20 NOTE — PATIENT INSTRUCTIONS
Preventive Health Recommendations  Female Ages 50 - 64    Yearly exam: See your health care provider every year in order to  o Review health changes.   o Discuss preventive care.    o Review your medicines if your doctor has prescribed any.      Get a Pap test every three years (unless you have an abnormal result and your provider advises testing more often).    If you get Pap tests with HPV test, you only need to test every 5 years, unless you have an abnormal result.     You do not need a Pap test if your uterus was removed (hysterectomy) and you have not had cancer.    You should be tested each year for STDs (sexually transmitted diseases) if you're at risk.     Have a mammogram every 1 to 2 years.    Have a colonoscopy at age 50, or have a yearly FIT test (stool test). These exams screen for colon cancer.      Have a cholesterol test every 5 years, or more often if advised.    Have a diabetes test (fasting glucose) every three years. If you are at risk for diabetes, you should have this test more often.     If you are at risk for osteoporosis (brittle bone disease), think about having a bone density scan (DEXA).    Shots: Get a flu shot each year. Get a tetanus shot every 10 years.    Nutrition:     Eat at least 5 servings of fruits and vegetables each day.    Eat whole-grain bread, whole-wheat pasta and brown rice instead of white grains and rice.    Get adequate Calcium and Vitamin D.     Lifestyle    Exercise at least 150 minutes a week (30 minutes a day, 5 days a week). This will help you control your weight and prevent disease.    Limit alcohol to one drink per day.    No smoking.     Wear sunscreen to prevent skin cancer.     See your dentist every six months for an exam and cleaning.    See your eye doctor every 1 to 2 years.    Patient Education     Preventing Osteoporosis: Meeting Your Calcium Needs    Your body needs calcium to build and repair bones. But it can't make calcium on its own. That's why  it's important to eat calcium-rich foods. Some foods are naturally rich in calcium. Others have calcium added (fortified). It's best to get calcium from the foods you eat. But if you can't get enough, you may want to take calcium supplements. To meet your daily calcium needs, try the foods listed below.  Dairy Fish & beans Other sources   Source   Calcium (mg) per serving   Source   Calcium (mg) per serving   Source   Calcium (mg) per serving   Low-fat yogurt, plain   415 mg/8 oz.   Sardines, Atlantic, canned, with bones   351 mg/3 oz.   Oatmeal, instant, fortified   215 mg/1 cup   Nonfat milk   302 mg/1 cup   Jackson, sockeye, canned, with bones   239 mg/3 oz.   Tofu made with calcium sulfate   204 mg/3 oz.   Low-fat milk   297 mg/1 cup   Soybeans, fresh, boiled   131 mg/1/2 cup   Collards   179 mg/1/2 cup   Swiss cheese   272 mg/1 oz.   White beans, cooked   81 mg/1/2 cup   English muffin, whole wheat   175 mg/1 muffin   Cheddar cheese   205 mg/1 oz.   Navy beans, cooked   79 mg/1/2 cup   Kale   90 mg/1/2 cup   Ice cream strawberry   79 mg/1/2 cup           Orange, navel   56 mg/1 medium   Note: Calcium levels may vary depending on brand and size.  Daily calcium needs  14 to 18 years old: 1,300 mg  19 to 30 years old: 1,000 mg  31 to 50 years old: 1,000 mg  51 to 70 years old, women: 1,200 mg  51 to 70 years old, men: 1,000 mg  Pregnant or nursin to 18 years old: 1,300 mg, 19 to 50 years old: 1,000 mg  Older than 70 (women and men): 1,200 mg   [employer] last reviewed this educational content on 2018-2021 [employer]. All rights reserved. This information is not intended as a substitute for professional medical care. Always follow your healthcare professional's instructions.

## 2021-05-20 NOTE — PROGRESS NOTES
SUBJECTIVE:   CC: Lyn Massey is an 60 year old woman who presents for preventive health visit.     Patient has been advised of split billing requirements and indicates understanding: Yes  Healthy Habits:    Do you get at least three servings of calcium containing foods daily (dairy, green leafy vegetables, etc.)? yes    Amount of exercise or daily activities, outside of work: 3-4 day(s) per week    Problems taking medications regularly No    Medication side effects: No    Have you had an eye exam in the past two years? no    Do you see a dentist twice per year? no    Do you have sleep apnea, excessive snoring or daytime drowsiness?no    Depression:  She reports that she has been more stressed in her day to day life.  She is not sure how much of this is related to what's been going on with the world.  She reports increased anxiety and feelings of hopelessness.  She would be interested trying an increased dose of her Sertraline.    Hyperlipidemia:  She has been taking her medication without complications.    Hypertension:  She does not check her blood pressure at home.    Today's PHQ-2 Score:   PHQ-2 ( 1999 Pfizer) 2/19/2020   Q1: Little interest or pleasure in doing things 0   Q2: Feeling down, depressed or hopeless 0   PHQ-2 Score 0     Abuse: Current or Past(Physical, Sexual or Emotional)- No  Do you feel safe in your environment? Yes    Social History     Tobacco Use     Smoking status: Current Every Day Smoker     Packs/day: 0.25     Years: 30.00     Pack years: 7.50     Types: Cigarettes     Smokeless tobacco: Never Used   Substance Use Topics     Alcohol use: Yes     Alcohol/week: 0.0 standard drinks     Comment: Moderate/socially      If you drink alcohol do you typically have >3 drinks per day or >7 drinks per week? No                     Reviewed orders with patient.  Reviewed health maintenance and updated orders accordingly - Yes  Lab work is in process    Mammogram Screening: Recommended mammography  every 1-2 years with patient discussion and risk factor consideration  Pertinent mammograms are reviewed under the imaging tab.    Pertinent mammograms are reviewed under the imaging tab.  History of abnormal Pap smear: NO - age 30-65 PAP every 5 years with negative HPV co-testing recommended     Reviewed and updated as needed this visit by clinical staff  Tobacco  Allergies  Meds   Med Hx  Surg Hx  Fam Hx  Soc Hx      Reviewed and updated as needed this visit by Provider                Past Medical History:   Diagnosis Date     Closed fracture of phalanx of finger     ,Index finger and laceration     Personal history of other medical treatment (CODE)     [id number] spontaneous first trimester losses      Past Surgical History:   Procedure Laterality Date     ARTHROSCOPY SHOULDER Right 2008      SECTION           COLONOSCOPY N/A 12/10/2018    Collegenous colitis.  1 tubular adenoma, follow up 5 years     OB History    Para Term  AB Living   3 1 0 0 2 1   SAB TAB Ectopic Multiple Live Births   0 0 0 0 0     ROS:  CONSTITUTIONAL: NEGATIVE for fever, chills, change in weight  INTEGUMENTARY/SKIN: NEGATIVE for worrisome rashes, moles or lesions  EYES: NEGATIVE for vision changes or irritation  ENT: NEGATIVE for ear, mouth and throat problems  RESP: NEGATIVE for significant cough or SOB  BREAST: NEGATIVE for masses, tenderness or discharge  CV: NEGATIVE for chest pain, palpitations or peripheral edema  GI: NEGATIVE for nausea, abdominal pain, heartburn, or change in bowel habits  : NEGATIVE for unusual urinary or vaginal symptoms. No vaginal bleeding.  MUSCULOSKELETAL: NEGATIVE for significant arthralgias or myalgia  NEURO: NEGATIVE for weakness, dizziness or paresthesias  PSYCHIATRIC: NEGATIVE except as noted in HPI    OBJECTIVE:   /82 (BP Location: Right arm, Patient Position: Sitting, Cuff Size: Adult Regular)   Pulse 58   Temp 97.8  F (36.6  C) (Temporal)   Resp 16   Ht  "1.57 m (5' 1.81\")   Wt 53.7 kg (118 lb 6.4 oz)   LMP  (LMP Unknown)   SpO2 97%   Breastfeeding No   BMI 21.79 kg/m    EXAM:  GENERAL APPEARANCE: healthy, alert and no distress  EYES: Eyes grossly normal to inspection, PERRL and conjunctivae and sclerae normal  HENT: ear canals and TM's normal, nose and mouth without ulcers or lesions, oropharynx clear and oral mucous membranes moist  NECK: no adenopathy, no asymmetry, masses, or scars and thyroid normal to palpation  RESP: lungs clear to auscultation - no rales, rhonchi or wheezes  CV: regular rate and rhythm, normal S1 S2, no S3 or S4, no murmur, click or rub, no peripheral edema and peripheral pulses strong  ABDOMEN: soft, nontender, no hepatosplenomegaly, no masses and bowel sounds normal  MS: no musculoskeletal defects are noted and gait is age appropriate without ataxia  SKIN: seborrheic keratosis present on skin of back  : Vaginal mucosa atrophy, normal cervix, no adnexal masses  NEURO: Normal strength and tone, mentation intact and speech normal  PSYCH: affect normal/bright    Diagnostic Test Results:  Labs reviewed in Epic    ASSESSMENT/PLAN:   1. Routine general medical examination at a health care facility  No daily ASA.  BP at goal < 130/80.  Check CBC and CMP for medication monitoring.  Continue moderate-intensity statin.  Lipid panel ordered for medication monitoring.  HIV screen ordered.  Mammogram ordered for breast cancer screening.  Pap smear performed for cervical cancer screening.  Colon cancer screening current.  Discussed lung cancer screening.  She declines today but will consider in the future.  Encouraged her to receive her shingles vaccine through her pharmacy  Encouraged tobacco cessation.  Depression medication adjustment.  No cognitive impairment.    2. Benign essential hypertension  - chlorthalidone (HYGROTON) 25 MG tablet; Take 1 tablet (25 mg) by mouth daily  Dispense: 90 tablet; Refill: 3  - CBC and Differential  - " "Comprehensive Metabolic Panel    3. Mixed hyperlipidemia  - rosuvastatin (CRESTOR) 20 MG tablet; Take 1 tablet (20 mg) by mouth daily  Dispense: 90 tablet; Refill: 3  - Lipid Panel    4. Major depression in remission (H)  - sertraline (ZOLOFT) 100 MG tablet; Take 1 tablet (100 mg) by mouth daily  Dispense: 90 tablet; Refill: 3    5. Tobacco abuse  Encouraged cessation.    6. Encounter for screening for HIV  - HIV Antigen Antibody Combo    7. Visit for screening mammogram  - MA Screen Bilateral w/Bernardo; Future    8. Screening for malignant neoplasm of cervix  - HPV High Risk Types DNA Cervical  - Pap Screen Thin Prep with HPV - recommended age 30 - 65 years (select HPV order below)    Patient has been advised of split billing requirements and indicates understanding: Yes  COUNSELING:   Reviewed preventive health counseling, as reflected in patient instructions       Regular exercise       Healthy diet/nutrition       Vision screening       Hearing screening       Immunizations       Osteoporosis prevention/bone health       Colon cancer screening       HIV screeninx in teen years, 1x in adult years, and at intervals if high risk       Consider lung cancer screening for ages 55-80 years and 30 pack-year smoking history    Estimated body mass index is 21.79 kg/m  as calculated from the following:    Height as of this encounter: 1.57 m (5' 1.81\").    Weight as of this encounter: 53.7 kg (118 lb 6.4 oz).        She reports that she has been smoking cigarettes. She has a 7.50 pack-year smoking history. She has never used smokeless tobacco.  Tobacco Cessation Action Plan:   She is working on weaning on her own.    Counseling Resources:  ATP IV Guidelines  Pooled Cohorts Equation Calculator  Breast Cancer Risk Calculator  BRCA-Related Cancer Risk Assessment: FHS-7 Tool  FRAX Risk Assessment  ICSI Preventive Guidelines  Dietary Guidelines for Americans, 2010  USDA's MyPlate  ASA Prophylaxis  Lung CA Screening    Divina " DO Elza  [facility]

## 2021-05-21 LAB — HIV 1+2 AB+HIV1 P24 AG SERPL QL IA: NONREACTIVE

## 2021-05-25 LAB
COPATH REPORT: NORMAL
PAP: NORMAL

## 2021-05-27 LAB
FINAL DIAGNOSIS: NORMAL
HPV HR 12 DNA CVX QL NAA+PROBE: NEGATIVE
HPV16 DNA SPEC QL NAA+PROBE: NEGATIVE
HPV18 DNA SPEC QL NAA+PROBE: NEGATIVE
SPECIMEN DESCRIPTION: NORMAL
SPECIMEN SOURCE CVX/VAG CYTO: NORMAL

## 2021-07-19 ENCOUNTER — OFFICE VISIT (OUTPATIENT)
Dept: FAMILY MEDICINE | Facility: OTHER | Age: 61
End: 2021-07-19
Attending: NURSE PRACTITIONER
Payer: COMMERCIAL

## 2021-07-19 VITALS
RESPIRATION RATE: 16 BRPM | DIASTOLIC BLOOD PRESSURE: 78 MMHG | SYSTOLIC BLOOD PRESSURE: 124 MMHG | TEMPERATURE: 98.1 F | OXYGEN SATURATION: 98 % | HEART RATE: 79 BPM | WEIGHT: 124.8 LBS | BODY MASS INDEX: 22.97 KG/M2

## 2021-07-19 DIAGNOSIS — S01.01XA LACERATION OF SCALP WITHOUT FOREIGN BODY, INITIAL ENCOUNTER: Primary | ICD-10-CM

## 2021-07-19 PROCEDURE — 90715 TDAP VACCINE 7 YRS/> IM: CPT | Performed by: PHYSICIAN ASSISTANT

## 2021-07-19 PROCEDURE — 99213 OFFICE O/P EST LOW 20 MIN: CPT | Mod: 25 | Performed by: PHYSICIAN ASSISTANT

## 2021-07-19 PROCEDURE — 12001 RPR S/N/AX/GEN/TRNK 2.5CM/<: CPT | Performed by: PHYSICIAN ASSISTANT

## 2021-07-19 PROCEDURE — 90471 IMMUNIZATION ADMIN: CPT | Performed by: PHYSICIAN ASSISTANT

## 2021-07-19 PROCEDURE — 250N000009 HC RX 250: Performed by: PHYSICIAN ASSISTANT

## 2021-07-19 RX ORDER — LIDOCAINE HYDROCHLORIDE 10 MG/ML
5 INJECTION, SOLUTION EPIDURAL; INFILTRATION; INTRACAUDAL; PERINEURAL ONCE
Status: DISPENSED | OUTPATIENT
Start: 2021-07-19

## 2021-07-19 RX ORDER — NEOMYCIN/BACITRACIN/POLYMYXINB 3.5-400-5K
OINTMENT (GRAM) TOPICAL ONCE
Status: DISPENSED | OUTPATIENT
Start: 2021-07-19

## 2021-07-19 RX ADMIN — LIDOCAINE-EPINEPHRINE-TETRACAINE GEL 4-0.05-0.5% 3 ML: 4-0.05-0.5 GEL at 13:36

## 2021-07-19 ASSESSMENT — PAIN SCALES - GENERAL: PAINLEVEL: NO PAIN (0)

## 2021-07-19 NOTE — PROGRESS NOTES
ASSESSMENT/PLAN:    I have reviewed the nursing notes.  I have reviewed the findings, diagnosis, plan and need for follow up with the patient.    (S01.01XA) Laceration of scalp without foreign body, initial encounter  (primary encounter diagnosis)  Comment: see below  Plan: lidocaine (PF) (XYLOCAINE) 1 % injection 5 mL, lido-EPINEPHrine-tetracaine (LET) topical gel GEL, neomycin-bacitracin-polymyxin (NEOSPORIN) ointment, TDAP VACCINE (Adacel, Boostrix)  [[id number]]        Vital signs stable.  Physical exam consistent with laceration to the anterior head near scalp, good hemostasis control.  Elected to use blood over lidocaine injection per patient preference to avoid needlestick.  Five, 6-0 simple interrupted nonabsorbable nylon sutures were placed, patient tolerated this well.  After this her tetanus status updated has been greater than 7 years since last tetanus.  Discussed good wound care with triple antibiotic and monitoring for signs of infection such as fevers, chills, abnormal drainage.  She should return for suture removal in approximately 7 days.  In regards to swimming, I did recommend the patient does not swim in hot tubs, pools or lakes until laceration is healed and sutures are removed.  She is able to shower as normal. Patient is in agreement and understanding of the above treatment plan. All questions and concerns were addressed and answered to patient's satisfaction. AVS reviewed with patient.     Discussed warning signs/symptoms indicative of need to f/u    Follow up if symptoms persist or worsen or concerns    I explained my diagnostic considerations and recommendations to the patient, who voiced understanding and agreement with the treatment plan. All questions were answered. We discussed potential side effects of any prescribed or recommended therapies, as well as expectations for response to treatments.    Monserrat Dolan PA-C  7/19/2021  1:26 PM    HPI:    Lyn Massey is a 60 year old female   who presents to [facility] today for concerns of laceration to head.   Injury occurred at just prior to arrival when she accidentally hit her head on car door while running outside to close windows. Hemostasis control: adequate.     Pain: 0/10  Changes to ROM/Strength: none  Treatments tried since injury: cover site.     Any allergies to suture or latex: No  Prior experience with local anesthetics: YES    Patient on blood thinners: No    Denies LOC. Denies SOB, fevers, chills, local or systemic signs of infection.     Immunizations UTD:  Tetanus    PCP: DO Elza    Past Medical History:   Diagnosis Date     Closed fracture of phalanx of finger     ,Index finger and laceration     Personal history of other medical treatment (CODE)     [id number] spontaneous first trimester losses     Past Surgical History:   Procedure Laterality Date     ARTHROSCOPY SHOULDER Right 2008      SECTION           COLONOSCOPY N/A 12/10/2018    Collegenous colitis.  1 tubular adenoma, follow up 5 years     Social History     Tobacco Use     Smoking status: Current Every Day Smoker     Packs/day: 0.25     Years: 30.00     Pack years: 7.50     Types: Cigarettes     Smokeless tobacco: Never Used   Substance Use Topics     Alcohol use: Yes     Alcohol/week: 0.0 standard drinks     Comment: Moderate/socially      Current Outpatient Medications   Medication Sig Dispense Refill     chlorthalidone (HYGROTON) 25 MG tablet Take 1 tablet (25 mg) by mouth daily 90 tablet 3     Multiple Vitamin (MULTI-VITAMINS) TABS Take 1 tablet by mouth daily       nicotine (NICODERM CQ) 7 MG/24HR 24 hr patch Place 1 patch onto the skin every 24 hours 14 patch 1     rosuvastatin (CRESTOR) 20 MG tablet Take 1 tablet (20 mg) by mouth daily 90 tablet 3     sertraline (ZOLOFT) 100 MG tablet Take 1 tablet (100 mg) by mouth daily 90 tablet 3     No Known Allergies  Past medical history, past surgical history, current medications and allergies  reviewed and accurate to the best of my knowledge.      ROS:  Refer to HPI    /78 (BP Location: Right arm, Patient Position: Sitting, Cuff Size: Adult Regular)   Pulse 79   Temp 98.1  F (36.7  C) (Tympanic)   Resp 16   Wt 56.6 kg (124 lb 12.8 oz)   LMP  (LMP Unknown)   SpO2 98%   BMI 22.97 kg/m      EXAM:  General Appearance: Well appearing 60-year-old female, appropriate appearance for age. No acute distress  Respiratory: normal chest wall and respirations.  Normal effort.  Clear to auscultation bilaterally, no wheezing, crackles or rhonchi.  No increased work of breathing.  No cough appreciated.  Cardiac: RRR with no murmurs  Dermatological: 2 cm vertical laceration to anterior/superior head near scalp.  Hemostasis well maintained.  No signs of foreign body or deep tissue involvement.  Psychological: normal affect, alert, oriented, and pleasant.     Labs:  None     Xray:  None     Procedural note:   Options are discussed and patient decided to proceed with the suture placement.  Risks and benefits discussed.  Written consent obtained.    Preparation: Patient was prepped and draped in usual sterile fashion.  Irrigation solution: saline   Body area: head  Laceration description: 2 cm vertical laceration   Contamination: No  Debridement: No  Foreign bodies: No  Tendon involvement: No  Anesthesia: Local  Anesthetic Type: LET (lido, epi, tetracaine)  Anesthetic Total: 2 mL  Closure: Simple  Suture: 6-0 simple interrupted nylon sutures, non absorbable   Number of Sutures: 5  Approximation: close  Suture removal in 6-7 day(s)     Patient tolerance: Patient tolerated the procedure well with no immediate complications.

## 2021-07-19 NOTE — NURSING NOTE
"Chief Complaint   Patient presents with     Head Laceration     Hit her head on the car door this morning. Laceration on upper forehead.     FOOD SECURITY SCREENING QUESTIONS  Hunger Vital Signs:  Within the past 12 months we worried whether our food would run out before we got money to buy more. Never  Within the past 12 months the food we bought just didn't last and we didn't have money to get more. Never  Bell Kelley LPN 7/19/2021 1:24 PM      Initial /78 (BP Location: Right arm, Patient Position: Sitting, Cuff Size: Adult Regular)   Pulse 79   Temp 98.1  F (36.7  C) (Tympanic)   Resp 16   Wt 56.6 kg (124 lb 12.8 oz)   LMP  (LMP Unknown)   SpO2 98%   BMI 22.97 kg/m   Estimated body mass index is 22.97 kg/m  as calculated from the following:    Height as of 5/20/21: 1.57 m (5' 1.81\").    Weight as of this encounter: 56.6 kg (124 lb 12.8 oz).  Medication Reconciliation: complete    Bell Kelley LPN  "

## 2021-07-19 NOTE — NURSING NOTE
Immunization Documentation    Prior to Immunization administration, verified patients identity using patient's name and date of birth. Please see IMMUNIZATIONS  and order for additional information.  Patient / Parent instructed to remain in clinic for 15 minutes and report any adverse reaction to staff immediately.    Was the entire amount of vaccines given used? Yes    Bell Kelley LPN  7/19/2021   2:23 PM

## 2021-11-11 ENCOUNTER — IMMUNIZATION (OUTPATIENT)
Dept: FAMILY MEDICINE | Facility: OTHER | Age: 61
End: 2021-11-11
Attending: FAMILY MEDICINE
Payer: COMMERCIAL

## 2021-11-11 DIAGNOSIS — Z23 NEED FOR PROPHYLACTIC VACCINATION AND INOCULATION AGAINST INFLUENZA: Primary | ICD-10-CM

## 2021-11-11 PROCEDURE — 90682 RIV4 VACC RECOMBINANT DNA IM: CPT

## 2021-11-11 PROCEDURE — 90471 IMMUNIZATION ADMIN: CPT

## 2021-11-11 PROCEDURE — 0004A PR COVID VAC PFIZER DIL RECON 30 MCG/0.3 ML IM: CPT

## 2021-11-11 PROCEDURE — 91300 PR COVID VAC PFIZER DIL RECON 30 MCG/0.3 ML IM: CPT

## 2021-11-11 NOTE — ADDENDUM NOTE
Addended by: STEVEN VAZQUEZ on: 11/11/2021 11:54 AM     Modules accepted: Orders, Level of Service, SmartSet

## 2022-04-13 ENCOUNTER — ALLIED HEALTH/NURSE VISIT (OUTPATIENT)
Dept: FAMILY MEDICINE | Facility: OTHER | Age: 62
End: 2022-04-13
Attending: FAMILY MEDICINE
Payer: COMMERCIAL

## 2022-04-13 DIAGNOSIS — Z23 NEED FOR COVID-19 VACCINE: Primary | ICD-10-CM

## 2022-04-13 PROCEDURE — 0054A COVID-19,PF,PFIZER (12+ YRS): CPT

## 2022-04-13 PROCEDURE — 91305 COVID-19,PF,PFIZER (12+ YRS): CPT

## 2022-08-05 DIAGNOSIS — F32.5 MAJOR DEPRESSION IN REMISSION (H): ICD-10-CM

## 2022-08-05 DIAGNOSIS — E78.2 MIXED HYPERLIPIDEMIA: ICD-10-CM

## 2022-08-05 DIAGNOSIS — I10 BENIGN ESSENTIAL HYPERTENSION: ICD-10-CM

## 2022-08-05 RX ORDER — SERTRALINE HYDROCHLORIDE 100 MG/1
TABLET, FILM COATED ORAL
Qty: 90 TABLET | Refills: 0 | Status: SHIPPED | OUTPATIENT
Start: 2022-08-05 | End: 2022-10-13

## 2022-08-05 RX ORDER — CHLORTHALIDONE 25 MG/1
TABLET ORAL
Qty: 90 TABLET | Refills: 0 | Status: SHIPPED | OUTPATIENT
Start: 2022-08-05 | End: 2022-10-13

## 2022-08-05 RX ORDER — ROSUVASTATIN CALCIUM 20 MG/1
TABLET, COATED ORAL
Qty: 90 TABLET | Refills: 0 | Status: SHIPPED | OUTPATIENT
Start: 2022-08-05 | End: 2022-10-13

## 2022-08-05 NOTE — TELEPHONE ENCOUNTER
[facility] sent Rx request for the following:      SERTRALINE 100MG TABLETS      Last Prescription Date:   5/2/2022  Last Fill Qty/Refills:         90, R-0    Last Office Visit:              5/20/2021   Future Office visit:           none      ROSUVASTATIN 20MG TABLETS      Last Prescription Date:   5/2/2022  Last Fill Qty/Refills:         90, R-0        CHLORTHALIDONE 25MG TABLETS      Last Prescription Date:   5/2/2022  Last Fill Qty/Refills:         90, R-0      Sudheer Mariano RN, BSN  ....................  8/5/2022   1:54 PM

## 2022-08-05 NOTE — LETTER
August 17, 2022      Lyn Massey  [address]  [place] [address]        Dear Lyn,     This letter is to remind you that you are due for your annual exam with Divina Bertrand. Your last comprehensive visit was more than 12 months ago.    Please call the clinic at [phone] to schedule your appointment.    Thank you for choosing [facility] and [facility] for your health care needs.    Sincerely,    Shakira BINGHAM  [facility]

## 2022-08-16 NOTE — TELEPHONE ENCOUNTER
[id number]  to schedule visit    Patient needs to schedule PX     Quynh Renteria on 8/16/2022 at 11:43 AM

## 2022-10-13 ENCOUNTER — OFFICE VISIT (OUTPATIENT)
Dept: FAMILY MEDICINE | Facility: OTHER | Age: 62
End: 2022-10-13
Attending: FAMILY MEDICINE
Payer: COMMERCIAL

## 2022-10-13 VITALS
OXYGEN SATURATION: 96 % | WEIGHT: 123.6 LBS | HEIGHT: 62 IN | SYSTOLIC BLOOD PRESSURE: 124 MMHG | BODY MASS INDEX: 22.74 KG/M2 | TEMPERATURE: 96.8 F | RESPIRATION RATE: 18 BRPM | HEART RATE: 77 BPM | DIASTOLIC BLOOD PRESSURE: 70 MMHG

## 2022-10-13 DIAGNOSIS — D68.51 FACTOR V LEIDEN (H): ICD-10-CM

## 2022-10-13 DIAGNOSIS — D36.9 TUBULAR ADENOMA: ICD-10-CM

## 2022-10-13 DIAGNOSIS — E78.2 MIXED HYPERLIPIDEMIA: ICD-10-CM

## 2022-10-13 DIAGNOSIS — F32.5 MAJOR DEPRESSION IN REMISSION (H): ICD-10-CM

## 2022-10-13 DIAGNOSIS — G25.81 RESTLESS LEG SYNDROME: ICD-10-CM

## 2022-10-13 DIAGNOSIS — Z00.00 ROUTINE GENERAL MEDICAL EXAMINATION AT A HEALTH CARE FACILITY: Primary | ICD-10-CM

## 2022-10-13 DIAGNOSIS — I10 BENIGN ESSENTIAL HYPERTENSION: ICD-10-CM

## 2022-10-13 DIAGNOSIS — Z72.0 TOBACCO ABUSE: ICD-10-CM

## 2022-10-13 LAB
ALBUMIN SERPL-MCNC: 4.7 G/DL (ref 3.5–5.7)
ALP SERPL-CCNC: 82 U/L (ref 34–104)
ALT SERPL W P-5'-P-CCNC: 47 U/L (ref 7–52)
ANION GAP SERPL CALCULATED.3IONS-SCNC: 9 MMOL/L (ref 3–14)
AST SERPL W P-5'-P-CCNC: 50 U/L (ref 13–39)
BASOPHILS # BLD AUTO: 0.1 10E3/UL (ref 0–0.2)
BASOPHILS NFR BLD AUTO: 1 %
BILIRUB SERPL-MCNC: 0.6 MG/DL (ref 0.3–1)
BUN SERPL-MCNC: 16 MG/DL (ref 7–25)
CALCIUM SERPL-MCNC: 10.2 MG/DL (ref 8.6–10.3)
CHLORIDE BLD-SCNC: 97 MMOL/L (ref 98–107)
CO2 SERPL-SCNC: 31 MMOL/L (ref 21–31)
CREAT SERPL-MCNC: 0.95 MG/DL (ref 0.6–1.2)
EOSINOPHIL # BLD AUTO: 0.1 10E3/UL (ref 0–0.7)
EOSINOPHIL NFR BLD AUTO: 1 %
ERYTHROCYTE [DISTWIDTH] IN BLOOD BY AUTOMATED COUNT: 13 % (ref 10–15)
FERRITIN SERPL-MCNC: 100 NG/ML (ref 24–336)
GFR SERPL CREATININE-BSD FRML MDRD: 68 ML/MIN/1.73M2
GLUCOSE BLD-MCNC: 95 MG/DL (ref 70–105)
HCT VFR BLD AUTO: 46.2 % (ref 35–47)
HGB BLD-MCNC: 16 G/DL (ref 11.7–15.7)
IMM GRANULOCYTES # BLD: 0 10E3/UL
IMM GRANULOCYTES NFR BLD: 0 %
LYMPHOCYTES # BLD AUTO: 2.2 10E3/UL (ref 0.8–5.3)
LYMPHOCYTES NFR BLD AUTO: 22 %
MCH RBC QN AUTO: 32.1 PG (ref 26.5–33)
MCHC RBC AUTO-ENTMCNC: 34.6 G/DL (ref 31.5–36.5)
MCV RBC AUTO: 93 FL (ref 78–100)
MONOCYTES # BLD AUTO: 0.6 10E3/UL (ref 0–1.3)
MONOCYTES NFR BLD AUTO: 6 %
NEUTROPHILS # BLD AUTO: 6.8 10E3/UL (ref 1.6–8.3)
NEUTROPHILS NFR BLD AUTO: 70 %
NRBC # BLD AUTO: 0 10E3/UL
NRBC BLD AUTO-RTO: 0 /100
PLATELET # BLD AUTO: 289 10E3/UL (ref 150–450)
POTASSIUM BLD-SCNC: 3.2 MMOL/L (ref 3.5–5.1)
PROT SERPL-MCNC: 7.8 G/DL (ref 6.4–8.9)
RBC # BLD AUTO: 4.99 10E6/UL (ref 3.8–5.2)
SODIUM SERPL-SCNC: 137 MMOL/L (ref 134–144)
WBC # BLD AUTO: 9.8 10E3/UL (ref 4–11)

## 2022-10-13 PROCEDURE — 36415 COLL VENOUS BLD VENIPUNCTURE: CPT | Mod: ZL | Performed by: FAMILY MEDICINE

## 2022-10-13 PROCEDURE — 90682 RIV4 VACC RECOMBINANT DNA IM: CPT | Performed by: FAMILY MEDICINE

## 2022-10-13 PROCEDURE — 82728 ASSAY OF FERRITIN: CPT | Mod: ZL | Performed by: FAMILY MEDICINE

## 2022-10-13 PROCEDURE — 90471 IMMUNIZATION ADMIN: CPT | Performed by: FAMILY MEDICINE

## 2022-10-13 PROCEDURE — 80053 COMPREHEN METABOLIC PANEL: CPT | Mod: ZL | Performed by: FAMILY MEDICINE

## 2022-10-13 PROCEDURE — 99396 PREV VISIT EST AGE 40-64: CPT | Mod: 25 | Performed by: FAMILY MEDICINE

## 2022-10-13 PROCEDURE — 85025 COMPLETE CBC W/AUTO DIFF WBC: CPT | Mod: ZL | Performed by: FAMILY MEDICINE

## 2022-10-13 RX ORDER — SERTRALINE HYDROCHLORIDE 100 MG/1
100 TABLET, FILM COATED ORAL DAILY
Qty: 90 TABLET | Refills: 4 | Status: SHIPPED | OUTPATIENT
Start: 2022-10-13 | End: 2023-12-27

## 2022-10-13 RX ORDER — GABAPENTIN 300 MG/1
300 CAPSULE ORAL AT BEDTIME
Qty: 90 CAPSULE | Refills: 4 | Status: SHIPPED | OUTPATIENT
Start: 2022-10-13 | End: 2023-12-27

## 2022-10-13 RX ORDER — CHLORTHALIDONE 25 MG/1
25 TABLET ORAL DAILY
Qty: 90 TABLET | Refills: 4 | Status: SHIPPED | OUTPATIENT
Start: 2022-10-13 | End: 2023-12-27

## 2022-10-13 RX ORDER — ROSUVASTATIN CALCIUM 20 MG/1
20 TABLET, COATED ORAL DAILY
Qty: 90 TABLET | Refills: 4 | Status: SHIPPED | OUTPATIENT
Start: 2022-10-13 | End: 2024-01-16 | Stop reason: DRUGHIGH

## 2022-10-13 RX ORDER — ROSUVASTATIN CALCIUM 40 MG/1
40 TABLET, COATED ORAL DAILY
Qty: 90 TABLET | Refills: 4 | Status: SHIPPED | OUTPATIENT
Start: 2022-10-13 | End: 2023-12-27

## 2022-10-13 ASSESSMENT — ENCOUNTER SYMPTOMS
CONSTIPATION: 0
HEARTBURN: 0
PARESTHESIAS: 0
WEAKNESS: 0
CHILLS: 0
COUGH: 0
NAUSEA: 0
BREAST MASS: 0
JOINT SWELLING: 1
ARTHRALGIAS: 1
FEVER: 0
HEMATURIA: 0
DIARRHEA: 0
DYSURIA: 0
PALPITATIONS: 0
MYALGIAS: 1
SORE THROAT: 0
HEMATOCHEZIA: 0
NERVOUS/ANXIOUS: 1
ABDOMINAL PAIN: 0
FREQUENCY: 0
DIZZINESS: 0
SHORTNESS OF BREATH: 0
HEADACHES: 0
EYE PAIN: 0

## 2022-10-13 ASSESSMENT — PATIENT HEALTH QUESTIONNAIRE - PHQ9: SUM OF ALL RESPONSES TO PHQ QUESTIONS 1-9: 0

## 2022-10-13 ASSESSMENT — PAIN SCALES - GENERAL: PAINLEVEL: NO PAIN (0)

## 2022-10-13 NOTE — LETTER
My Depression Action Plan  Name: Lyn Massey   Date of Birth 1960  Date: 10/13/2022    My doctor: Juanjose Walker   My clinic: [facility]  [address]  [place] [id number] [phone]          GREEN    ZONE   Good Control    What it looks like:     Things are going generally well. You have normal ups and downs. You may even feel depressed from time to time, but bad moods usually last less than a day.   What you need to do:  1. Continue to care for yourself (see self care plan)  2. Check your depression survival kit and update it as needed  3. Follow your physician s recommendations including any medication.  4. Do not stop taking medication unless you consult with your physician first.           YELLOW         ZONE Getting Worse    What it looks like:     Depression is starting to interfere with your life.     It may be hard to get out of bed; you may be starting to isolate yourself from others.    Symptoms of depression are starting to last most all day and this has happened for several days.     You may have suicidal thoughts but they are not constant.   What you need to do:     1. Call your care team. Your response to treatment will improve if you keep your care team informed of your progress. Yellow periods are signs an adjustment may need to be made.     2. Continue your self-care.  Just get dressed and ready for the day.  Don't give yourself time to talk yourself out of it.    3. Talk to someone in your support network.    4. Open up your Depression Self-Care Plan/Wellness Kit.           RED    ZONE Medical Alert - Get Help    What it looks like:     Depression is seriously interfering with your life.     You may experience these or other symptoms: You can t get out of bed most days, can t work or engage in other necessary activities, you have trouble taking care of basic hygiene, or basic responsibilities, thoughts of suicide or death that will not  go away, self-injurious behavior.     What you need to do:  1. Call your care team and request a same-day appointment. If they are not available (weekends or after hours) call your local crisis line, emergency room or 911.          Depression Self-Care Plan / Wellness Kit    Many people find that medication and therapy are helpful treatments for managing depression. In addition, making small changes to your everyday life can help to boost your mood and improve your wellbeing. Below are some tips for you to consider. Be sure to talk with your medical provider and/or behavioral health consultant if your symptoms are worsening or not improving.     Sleep   Sleep hygiene  means all of the habits that support good, restful sleep. It includes maintaining a consistent bedtime and wake time, using your bedroom only for sleeping or sex, and keeping the bedroom dark and free of distractions like a computer, smartphone, or television.     Develop a Healthy Routine  Maintain good hygiene. Get out of bed in the morning, make your bed, brush your teeth, take a shower, and get dressed. Don t spend too much time viewing media that makes you feel stressed. Find time to relax each day.    Exercise  Get some form of exercise every day. This will help reduce pain and release endorphins, the  feel good  chemicals in your brain. It can be as simple as just going for a walk or doing some gardening, anything that will get you moving.      Diet  Strive to eat healthy foods, including fruits and vegetables. Drink plenty of water. Avoid excessive sugar, caffeine, alcohol, and other mood-altering substances.     Stay Connected with Others  Stay in touch with friends and family members.    Manage Your Mood  Try deep breathing, massage therapy, biofeedback, or meditation. Take part in fun activities when you can. Try to find something to smile about each day.     Psychotherapy  Be open to working with a therapist if your provider recommends it.      Medication  Be sure to take your medication as prescribed. Most anti-depressants need to be taken every day. It usually takes several weeks for medications to work. Not all medicines work for all people. It is important to follow-up with your provider to make sure you have a treatment plan that is working for you. Do not stop your medication abruptly without first discussing it with your provider.    Crisis Resources   These hotlines are for both adults and children. They and are open 24 hours a day, 7 days a week unless noted otherwise.      National Suicide Prevention Lifeline   988 or 7-266-414-GRJO (0475)      Crisis Text Line    www.crisistextline.org  Text HOME to 366436 from anywhere in the [place], anytime, about any type of crisis. A live, trained crisis counselor will receive the text and respond quickly.      [place] Lifeline for LGBTQ Youth  A national crisis intervention and suicide lifeline for LGBTQ youth under 25. Provides a safe place to talk without judgement. Call 1-925.122.5817; text START to 313612 or visit www.thetrevorproject.org to talk to a trained counselor.      For [facility] crisis numbers, visit the [facility] website at:  https://[place].gov/dhs/people-we-serve/adults/health-care/mental-health/resources/crisis-contacts.jsp

## 2022-10-13 NOTE — LETTER
October 17, 2022      Lyn Massey  [address] [place] [address]        Dear ,    We are writing to inform you of your test results.    As you can see your labs did come back normal except for a slightly low potassium.  I did send in prescription for potassium replacement.  Would recommend 1 pill a day.  At your next visit we can recheck this.    Resulted Orders   Comprehensive Metabolic Panel   Result Value Ref Range    Sodium 137 134 - 144 mmol/L    Potassium 3.2 (L) 3.5 - 5.1 mmol/L    Chloride 97 (L) 98 - 107 mmol/L    Carbon Dioxide (CO2) 31 21 - 31 mmol/L    Anion Gap 9 3 - 14 mmol/L    Urea Nitrogen 16 7 - 25 mg/dL    Creatinine 0.95 0.60 - 1.20 mg/dL    Calcium 10.2 8.6 - 10.3 mg/dL    Glucose 95 70 - 105 mg/dL    Alkaline Phosphatase 82 34 - 104 U/L    AST 50 (H) 13 - 39 U/L    ALT 47 7 - 52 U/L    Protein Total 7.8 6.4 - 8.9 g/dL    Albumin 4.7 3.5 - 5.7 g/dL    Bilirubin Total 0.6 0.3 - 1.0 mg/dL    GFR Estimate 68 >60 mL/min/1.73m2      Comment:      Effective December 21, 2021 eGFRcr in adults is calculated using the 2021 CKD-EPI creatinine equation which includes age and gender (Arnie et al., NEJ, DOI: 10.1056/HXGOtu7754207)   Ferritin   Result Value Ref Range    Ferritin 100 24 - 336 ng/mL   CBC with platelets and differential   Result Value Ref Range    WBC Count 9.8 4.0 - 11.0 10e3/uL    RBC Count 4.99 3.80 - 5.20 10e6/uL    Hemoglobin 16.0 (H) 11.7 - 15.7 g/dL    Hematocrit 46.2 35.0 - 47.0 %    MCV 93 78 - 100 fL    MCH 32.1 26.5 - 33.0 pg    MCHC 34.6 31.5 - 36.5 g/dL    RDW 13.0 10.0 - 15.0 %    Platelet Count 289 150 - 450 10e3/uL    % Neutrophils 70 %    % Lymphocytes 22 %    % Monocytes 6 %    % Eosinophils 1 %    % Basophils 1 %    % Immature Granulocytes 0 %    NRBCs per 100 WBC 0 <1 /100    Absolute Neutrophils 6.8 1.6 - 8.3 10e3/uL    Absolute Lymphocytes 2.2 0.8 - 5.3 10e3/uL    Absolute Monocytes 0.6 0.0 - 1.3 10e3/uL    Absolute Eosinophils 0.1 0.0 - 0.7 10e3/uL     Absolute Basophils 0.1 0.0 - 0.2 10e3/uL    Absolute Immature Granulocytes 0.0 <=0.4 10e3/uL    Absolute NRBCs 0.0 10e3/uL       If you have any questions or concerns, please call the clinic at the number listed above.       Sincerely,      Juanjose Walker MD

## 2022-10-13 NOTE — NURSING NOTE
Patient here for yearly physical, medication refills and to discuss restless legs and sleep at night. Medication Reconciliation: complete.    Tanisha Christie LPN  10/13/2022 9:58 AM

## 2022-10-13 NOTE — PROGRESS NOTES
Assessment & Plan     Benign essential hypertension  Results for orders placed or performed in visit on 10/13/22   Comprehensive Metabolic Panel     Status: Abnormal   Result Value Ref Range    Sodium 137 134 - 144 mmol/L    Potassium 3.2 (L) 3.5 - 5.1 mmol/L    Chloride 97 (L) 98 - 107 mmol/L    Carbon Dioxide (CO2) 31 21 - 31 mmol/L    Anion Gap 9 3 - 14 mmol/L    Urea Nitrogen 16 7 - 25 mg/dL    Creatinine 0.95 0.60 - 1.20 mg/dL    Calcium 10.2 8.6 - 10.3 mg/dL    Glucose 95 70 - 105 mg/dL    Alkaline Phosphatase 82 34 - 104 U/L    AST 50 (H) 13 - 39 U/L    ALT 47 7 - 52 U/L    Protein Total 7.8 6.4 - 8.9 g/dL    Albumin 4.7 3.5 - 5.7 g/dL    Bilirubin Total 0.6 0.3 - 1.0 mg/dL    GFR Estimate 68 >60 mL/min/1.73m2   Ferritin     Status: Normal   Result Value Ref Range    Ferritin 100 24 - 336 ng/mL   CBC with platelets and differential     Status: Abnormal   Result Value Ref Range    WBC Count 9.8 4.0 - 11.0 10e3/uL    RBC Count 4.99 3.80 - 5.20 10e6/uL    Hemoglobin 16.0 (H) 11.7 - 15.7 g/dL    Hematocrit 46.2 35.0 - 47.0 %    MCV 93 78 - 100 fL    MCH 32.1 26.5 - 33.0 pg    MCHC 34.6 31.5 - 36.5 g/dL    RDW 13.0 10.0 - 15.0 %    Platelet Count 289 150 - 450 10e3/uL    % Neutrophils 70 %    % Lymphocytes 22 %    % Monocytes 6 %    % Eosinophils 1 %    % Basophils 1 %    % Immature Granulocytes 0 %    NRBCs per 100 WBC 0 <1 /100    Absolute Neutrophils 6.8 1.6 - 8.3 10e3/uL    Absolute Lymphocytes 2.2 0.8 - 5.3 10e3/uL    Absolute Monocytes 0.6 0.0 - 1.3 10e3/uL    Absolute Eosinophils 0.1 0.0 - 0.7 10e3/uL    Absolute Basophils 0.1 0.0 - 0.2 10e3/uL    Absolute Immature Granulocytes 0.0 <=0.4 10e3/uL    Absolute NRBCs 0.0 10e3/uL   CBC and Differential     Status: Abnormal    Narrative    The following orders were created for panel order CBC and Differential.  Procedure                               Abnormality         Status                     ---------                               -----------          ------                     CBC with platelets and d...[[id number]]  Abnormal            Final result                 Please view results for these tests on the individual orders.     Will discuss replacement letter will be sent.    - chlorthalidone (HYGROTON) 25 MG tablet; Take 1 tablet (25 mg) by mouth daily  - Comprehensive Metabolic Panel; Future  - Comprehensive Metabolic Panel    Mixed hyperlipidemia  We will increase her Crestor to 40 mg a day.  Patient was advised not to fill the 20 mg tablet.    - rosuvastatin (CRESTOR) 40 MG tablet; Take 1 tablet (40 mg) by mouth daily    Major depression in remission (H)  Currently under good control  - sertraline (ZOLOFT) 100 MG tablet; Take 1 tablet (100 mg) by mouth daily    Routine general medical examination at a health care facility  Satisfactory  - MA Screening Digital Bilateral; Future  - FLU SHOT 50 - 64 YEARS (FLUBLOK)  - CBC and Differential; Future  - CBC and Differential    Factor V Leiden (H)  Currently stable.  No history of PEs    Tobacco abuse  Encouraged tobacco cessation.  Patient is not interested in medication at this time    Tubular adenoma  Colonoscopy is up-to-date    Restless leg syndrome  Ferritin satisfactory we will try gabapentin at night  - Ferritin; Future  - gabapentin (NEURONTIN) 300 MG capsule; Take 1 capsule (300 mg) by mouth At Bedtime  - Ferritin             Nicotine/Tobacco Cessation:  She reports that she has been smoking cigarettes. She has a 8.75 pack-year smoking history. She has never used smokeless tobacco.  Nicotine/Tobacco Cessation Plan:             No follow-ups on file.    Juanjose Walker MD  [facility]   Lyn is a 61 year old, presenting for the following health issues:  Physical      Patient arrives here for physical.  Only complaint is sleeplessness and leg restlessness at night.  States that when she moves and she feels better for short period of time only.  Patient also is in need of  "a mammogram.  And a influenza vaccine.    Healthy Habits:     Getting at least 3 servings of Calcium per day:  Yes    Bi-annual eye exam:  NO    Dental care twice a year:  Yes    Sleep apnea or symptoms of sleep apnea:  Daytime drowsiness    Diet:  Regular (no restrictions)    Frequency of exercise:  4-5 days/week    Duration of exercise:  15-30 minutes    Taking medications regularly:  Yes    Medication side effects:  None    PHQ-2 Total Score: 0    Additional concerns today:  Yes             Review of Systems         Objective    /70   Pulse 77   Temp 96.8  F (36  C)   Resp 18   Ht 1.575 m (5' 2\")   Wt 56.1 kg (123 lb 9.6 oz)   LMP  (LMP Unknown)   SpO2 96%   BMI 22.61 kg/m    Body mass index is 22.61 kg/m .  Physical Exam  Constitutional:       Appearance: Normal appearance.   HENT:      Right Ear: Tympanic membrane normal.      Left Ear: Tympanic membrane normal.   Eyes:      Pupils: Pupils are equal, round, and reactive to light.   Cardiovascular:      Rate and Rhythm: Normal rate and regular rhythm.   Pulmonary:      Breath sounds: Normal breath sounds.   Abdominal:      General: Abdomen is flat.   Musculoskeletal:         General: Normal range of motion.   Skin:     General: Skin is warm.   Neurological:      Mental Status: She is alert.   Psychiatric:         Mood and Affect: Mood normal.         Thought Content: Thought content normal.            No results found for this or any previous visit (from the past 24 hour(s)).                "

## 2022-10-17 RX ORDER — POTASSIUM CHLORIDE 1500 MG/1
20 TABLET, EXTENDED RELEASE ORAL DAILY
Qty: 90 TABLET | Refills: 3 | Status: SHIPPED | OUTPATIENT
Start: 2022-10-17 | End: 2024-01-16

## 2023-04-06 ENCOUNTER — VIRTUAL VISIT (OUTPATIENT)
Dept: FAMILY MEDICINE | Facility: OTHER | Age: 63
End: 2023-04-06
Attending: FAMILY MEDICINE
Payer: COMMERCIAL

## 2023-04-06 DIAGNOSIS — U07.1 INFECTION DUE TO 2019 NOVEL CORONAVIRUS: Primary | ICD-10-CM

## 2023-04-06 PROCEDURE — 99212 OFFICE O/P EST SF 10 MIN: CPT | Mod: 95 | Performed by: FAMILY MEDICINE

## 2023-04-06 NOTE — NURSING NOTE
ICU RN

TEMP 100 RENDERED COOLING MEASURES; ADMINISTERED TYLENOL. Patient tested positive at home for COVID last night 04/05/2023. Her  is also positive. Symptoms include low grade fever, body aches, nausea, diarrhea  And sinus pressure. She would like to be treated with the antiviral medication.  Medication Reconciliation: complete.    Tanisha Christie LPN  4/6/2023 8:20 AM

## 2023-04-06 NOTE — PROGRESS NOTES
Lyn is a 62 year old who is being evaluated via a billable telephone visit.      What phone number would you like to be contacted at? [phone]  How would you like to obtain your AVS? Mail a copy  Distant Location (provider location):  On-site    Assessment & Plan     Infection due to 2019 novel coronavirus    - nirmatrelvir and ritonavir (PAXLOVID) 300 mg/100 mg therapy pack; Take 3 tablets by mouth 2 times daily for 5 days (Take 2 Nirmatrelvir tablets and 1 Ritonavir tablet twice daily for 5 days)                 No follow-ups on file.    Juanjose Walker MD  [facility]    Subjective   Lyn is a 62 year old, presenting for the following health issues:  COVID treatment          View : No data to display.              HPI         COVID-19 Symptom Review  How many days ago did these symptoms start? Last night     Are any of the following symptoms significant for you?    New or worsening difficulty breathing? No    Worsening cough? Yes, it's a dry cough.     Fever or chills? Yes, I felt feverish or had chills.    Headache: YES    Sore throat: YES    Chest pain: No    Diarrhea: YES    Body aches? YES    What treatments has patient tried? Cough syrup   Does patient live in a nursing home, group home, or shelter? No  Does patient have a way to get food/medications during quarantined? Yes, I have a friend or family member who can help me.            Review of Systems         Objective           Vitals:  No vitals were obtained today due to virtual visit.    Physical Exam     PSYCH: Alert and oriented times 3; coherent speech, normal   rate and volume, able to articulate logical thoughts, able   to abstract reason, no tangential thoughts, no hallucinations   or delusions  Her affect is   RESP: No cough, no audible wheezing, able to talk in full sentences  Remainder of exam unable to be completed due to telephone visits                Phone call duration: 12 minutes

## 2023-12-22 DIAGNOSIS — F32.5 MAJOR DEPRESSION IN REMISSION (H): ICD-10-CM

## 2023-12-22 DIAGNOSIS — I10 BENIGN ESSENTIAL HYPERTENSION: ICD-10-CM

## 2023-12-22 DIAGNOSIS — G25.81 RESTLESS LEG SYNDROME: ICD-10-CM

## 2023-12-22 DIAGNOSIS — E78.2 MIXED HYPERLIPIDEMIA: ICD-10-CM

## 2023-12-27 DIAGNOSIS — I10 BENIGN ESSENTIAL HYPERTENSION: ICD-10-CM

## 2023-12-27 DIAGNOSIS — F32.5 MAJOR DEPRESSION IN REMISSION (H): ICD-10-CM

## 2023-12-27 DIAGNOSIS — E78.2 MIXED HYPERLIPIDEMIA: ICD-10-CM

## 2023-12-27 RX ORDER — GABAPENTIN 300 MG/1
300 CAPSULE ORAL AT BEDTIME
Qty: 30 CAPSULE | Refills: 1 | Status: SHIPPED | OUTPATIENT
Start: 2023-12-27 | End: 2024-01-16

## 2023-12-27 RX ORDER — CHLORTHALIDONE 25 MG/1
25 TABLET ORAL DAILY
Qty: 30 TABLET | Refills: 1 | Status: SHIPPED | OUTPATIENT
Start: 2023-12-27 | End: 2023-12-28

## 2023-12-27 RX ORDER — ROSUVASTATIN CALCIUM 40 MG/1
40 TABLET, COATED ORAL DAILY
Qty: 30 TABLET | Refills: 1 | Status: SHIPPED | OUTPATIENT
Start: 2023-12-27 | End: 2023-12-28

## 2023-12-27 RX ORDER — SERTRALINE HYDROCHLORIDE 100 MG/1
100 TABLET, FILM COATED ORAL DAILY
Qty: 30 TABLET | Refills: 1 | Status: SHIPPED | OUTPATIENT
Start: 2023-12-27 | End: 2023-12-28

## 2023-12-27 NOTE — TELEPHONE ENCOUNTER
"[facility] sent Rx request for the following:      Requested Prescriptions   Pending Prescriptions Disp Refills    chlorthalidone (HYGROTON) 25 MG tablet [Pharmacy Med Name: CHLORTHALIDONE 25MG TABLETS] 90 tablet 4     Sig: TAKE 1 TABLET(25 MG) BY MOUTH DAILY       Diuretics (Including Combos) Protocol Failed - 12/22/2023  5:24 AM        Failed - Blood pressure under 140/90 in past 12 months     BP Readings from Last 3 Encounters:   10/13/22 124/70   07/19/21 124/78   05/20/21 124/82                 Failed - Normal serum creatinine on file in past 12 months     Recent Labs   Lab Test 10/13/22  1031   CR 0.95              Failed - Normal serum potassium on file in past 12 months     Recent Labs   Lab Test 10/13/22  1031   POTASSIUM 3.2*                    Failed - Normal serum sodium on file in past 12 months     Recent Labs   Lab Test 10/13/22  1031              Last Prescription Date:   10/13/22  Last Fill Qty/Refills:         90, R-4             gabapentin (NEURONTIN) 300 MG capsule [Pharmacy Med Name: GABAPENTIN 300MG CAPSULES] 90 capsule 4     Sig: TAKE 1 CAPSULE(300 MG) BY MOUTH AT BEDTIME       There is no refill protocol information for this order     Last Prescription Date:   10/13/22  Last Fill Qty/Refills:         90, R-4        sertraline (ZOLOFT) 100 MG tablet [Pharmacy Med Name: SERTRALINE 100MG TABLETS] 90 tablet 4     Sig: TAKE 1 TABLET(100 MG) BY MOUTH DAILY       SSRIs Protocol Failed - 12/22/2023  5:24 AM        Failed - PHQ-9 score less than 5 in past 6 months     Please review last PHQ-9 score.           Failed - Recent (6 mo) or future (30 days) visit within the authorizing provider's specialty     Patient had office visit in the last 6 months or has a visit in the next 30 days with authorizing provider or within the authorizing provider's specialty.  See \"Patient Info\" tab in inbasket, or \"Choose Columns\" in Meds & Orders section of the refill encounter.     "     Last Prescription Date:   10/13/22  Last Fill Qty/Refills:         90, R-4             rosuvastatin (CRESTOR) 40 MG tablet [Pharmacy Med Name: ROSUVASTATIN 40MG TABLETS] 90 tablet 4     Sig: TAKE 1 TABLET(40 MG) BY MOUTH DAILY       Statins Protocol Failed - 12/22/2023  5:24 AM        Failed - LDL on file in past 12 months     Recent Labs   Lab Test 05/20/21  0902   *            Last Prescription Date:   10/13/22  Last Fill Qty/Refills:         90, R-4    Last Office Visit:              10/13/22   Future Office visit:           none    Routing refill request to provider for review/approval because:  Drug not on the [facility] refill protocol   Patient needs to be seen because it has been more than 1 year since last office visit.    Routed to provider and unit 1 schedulers. Patient needs appointment for medication refills and yearly exam.    Winifred Fritz RN on 12/27/2023 at 11:17 AM

## 2023-12-28 RX ORDER — CHLORTHALIDONE 25 MG/1
25 TABLET ORAL DAILY
Qty: 30 TABLET | Refills: 1 | Status: SHIPPED | OUTPATIENT
Start: 2023-12-28 | End: 2024-01-16

## 2023-12-28 RX ORDER — ROSUVASTATIN CALCIUM 40 MG/1
40 TABLET, COATED ORAL DAILY
Qty: 30 TABLET | Refills: 1 | Status: SHIPPED | OUTPATIENT
Start: 2023-12-28 | End: 2024-01-16

## 2023-12-28 RX ORDER — SERTRALINE HYDROCHLORIDE 100 MG/1
100 TABLET, FILM COATED ORAL DAILY
Qty: 30 TABLET | Refills: 1 | Status: SHIPPED | OUTPATIENT
Start: 2023-12-28 | End: 2024-01-16

## 2023-12-28 NOTE — TELEPHONE ENCOUNTER
Patient is requesting 90 day supply of the following:    chlorthalidone (HYGROTON) 25 MG tablet 30 tablet 1 12/27/2023 -- No   Sig - Route: TAKE 1 TABLET(25 MG) BY MOUTH DAILY - Oral     sertraline (ZOLOFT) 100 MG tablet 30 tablet 1 12/27/2023 -- No   Sig - Route: TAKE 1 TABLET(100 MG) BY MOUTH DAILY - Oral      Disp Refills Start End WENCESLAO   rosuvastatin (CRESTOR) 40 MG tablet 30 tablet 1 12/27/2023 -- No   Sig - Route: TAKE 1 TABLET(40 MG) BY MOUTH DAILY - Oral     Ruchi Pretty RN .............. 12/28/2023  9:45 AM

## 2023-12-30 DIAGNOSIS — F32.5 MAJOR DEPRESSION IN REMISSION (H): ICD-10-CM

## 2023-12-30 DIAGNOSIS — I10 BENIGN ESSENTIAL HYPERTENSION: ICD-10-CM

## 2023-12-30 DIAGNOSIS — E78.2 MIXED HYPERLIPIDEMIA: ICD-10-CM

## 2024-01-03 RX ORDER — ROSUVASTATIN CALCIUM 40 MG/1
40 TABLET, COATED ORAL DAILY
Qty: 90 TABLET | OUTPATIENT
Start: 2024-01-03

## 2024-01-03 RX ORDER — CHLORTHALIDONE 25 MG/1
25 TABLET ORAL DAILY
Qty: 90 TABLET | OUTPATIENT
Start: 2024-01-03

## 2024-01-03 RX ORDER — SERTRALINE HYDROCHLORIDE 100 MG/1
100 TABLET, FILM COATED ORAL DAILY
Qty: 90 TABLET | OUTPATIENT
Start: 2024-01-03

## 2024-01-16 ENCOUNTER — OFFICE VISIT (OUTPATIENT)
Dept: FAMILY MEDICINE | Facility: OTHER | Age: 64
End: 2024-01-16
Attending: FAMILY MEDICINE
Payer: COMMERCIAL

## 2024-01-16 VITALS
RESPIRATION RATE: 20 BRPM | HEIGHT: 62 IN | DIASTOLIC BLOOD PRESSURE: 70 MMHG | SYSTOLIC BLOOD PRESSURE: 126 MMHG | HEART RATE: 66 BPM | BODY MASS INDEX: 21.12 KG/M2 | WEIGHT: 114.8 LBS | OXYGEN SATURATION: 97 % | TEMPERATURE: 97.3 F

## 2024-01-16 DIAGNOSIS — Z02.89 HEALTH EXAMINATION OF DEFINED SUBPOPULATION: Primary | ICD-10-CM

## 2024-01-16 DIAGNOSIS — G25.81 RESTLESS LEG SYNDROME: ICD-10-CM

## 2024-01-16 DIAGNOSIS — E78.2 MIXED HYPERLIPIDEMIA: ICD-10-CM

## 2024-01-16 DIAGNOSIS — H90.0 CONDUCTIVE HEARING LOSS, BILATERAL: ICD-10-CM

## 2024-01-16 DIAGNOSIS — I10 BENIGN ESSENTIAL HYPERTENSION: ICD-10-CM

## 2024-01-16 DIAGNOSIS — D36.9 TUBULAR ADENOMA: ICD-10-CM

## 2024-01-16 DIAGNOSIS — I10 ESSENTIAL HYPERTENSION: ICD-10-CM

## 2024-01-16 DIAGNOSIS — D68.51 FACTOR V LEIDEN (H): ICD-10-CM

## 2024-01-16 DIAGNOSIS — F32.5 MAJOR DEPRESSION IN REMISSION (H): ICD-10-CM

## 2024-01-16 LAB
ALBUMIN SERPL BCG-MCNC: 4.9 G/DL (ref 3.5–5.2)
ALP SERPL-CCNC: 107 U/L (ref 40–150)
ALT SERPL W P-5'-P-CCNC: 47 U/L (ref 0–50)
ANION GAP SERPL CALCULATED.3IONS-SCNC: 16 MMOL/L (ref 7–15)
AST SERPL W P-5'-P-CCNC: 49 U/L (ref 0–45)
BASOPHILS # BLD AUTO: 0.1 10E3/UL (ref 0–0.2)
BASOPHILS NFR BLD AUTO: 1 %
BILIRUB SERPL-MCNC: 0.5 MG/DL
BUN SERPL-MCNC: 18.6 MG/DL (ref 8–23)
CALCIUM SERPL-MCNC: 10.2 MG/DL (ref 8.8–10.2)
CHLORIDE SERPL-SCNC: 94 MMOL/L (ref 98–107)
CHOLEST SERPL-MCNC: 226 MG/DL
CREAT SERPL-MCNC: 1.01 MG/DL (ref 0.51–0.95)
DEPRECATED HCO3 PLAS-SCNC: 27 MMOL/L (ref 22–29)
EGFRCR SERPLBLD CKD-EPI 2021: 62 ML/MIN/1.73M2
EOSINOPHIL # BLD AUTO: 0.1 10E3/UL (ref 0–0.7)
EOSINOPHIL NFR BLD AUTO: 1 %
ERYTHROCYTE [DISTWIDTH] IN BLOOD BY AUTOMATED COUNT: 12.5 % (ref 10–15)
FASTING STATUS PATIENT QL REPORTED: YES
GLUCOSE SERPL-MCNC: 95 MG/DL (ref 70–99)
HCT VFR BLD AUTO: 45.6 % (ref 35–47)
HDLC SERPL-MCNC: 72 MG/DL
HGB BLD-MCNC: 16.6 G/DL (ref 11.7–15.7)
IMM GRANULOCYTES # BLD: 0 10E3/UL
IMM GRANULOCYTES NFR BLD: 0 %
LDLC SERPL CALC-MCNC: 127 MG/DL
LYMPHOCYTES # BLD AUTO: 1.9 10E3/UL (ref 0.8–5.3)
LYMPHOCYTES NFR BLD AUTO: 20 %
MCH RBC QN AUTO: 32 PG (ref 26.5–33)
MCHC RBC AUTO-ENTMCNC: 36.4 G/DL (ref 31.5–36.5)
MCV RBC AUTO: 88 FL (ref 78–100)
MONOCYTES # BLD AUTO: 0.7 10E3/UL (ref 0–1.3)
MONOCYTES NFR BLD AUTO: 7 %
NEUTROPHILS # BLD AUTO: 6.9 10E3/UL (ref 1.6–8.3)
NEUTROPHILS NFR BLD AUTO: 71 %
NONHDLC SERPL-MCNC: 154 MG/DL
NRBC # BLD AUTO: 0 10E3/UL
NRBC BLD AUTO-RTO: 0 /100
PLATELET # BLD AUTO: 294 10E3/UL (ref 150–450)
POTASSIUM SERPL-SCNC: 2.9 MMOL/L (ref 3.4–5.3)
PROT SERPL-MCNC: 8 G/DL (ref 6.4–8.3)
RBC # BLD AUTO: 5.18 10E6/UL (ref 3.8–5.2)
SODIUM SERPL-SCNC: 137 MMOL/L (ref 135–145)
TRIGL SERPL-MCNC: 137 MG/DL
WBC # BLD AUTO: 9.6 10E3/UL (ref 4–11)

## 2024-01-16 PROCEDURE — 90471 IMMUNIZATION ADMIN: CPT | Performed by: FAMILY MEDICINE

## 2024-01-16 PROCEDURE — 36415 COLL VENOUS BLD VENIPUNCTURE: CPT | Mod: ZL | Performed by: FAMILY MEDICINE

## 2024-01-16 PROCEDURE — 90480 ADMN SARSCOV2 VAC 1/ONLY CMP: CPT | Performed by: FAMILY MEDICINE

## 2024-01-16 PROCEDURE — 91320 SARSCV2 VAC 30MCG TRS-SUC IM: CPT | Performed by: FAMILY MEDICINE

## 2024-01-16 PROCEDURE — 80053 COMPREHEN METABOLIC PANEL: CPT | Mod: ZL | Performed by: FAMILY MEDICINE

## 2024-01-16 PROCEDURE — 80061 LIPID PANEL: CPT | Mod: ZL | Performed by: FAMILY MEDICINE

## 2024-01-16 PROCEDURE — 99396 PREV VISIT EST AGE 40-64: CPT | Mod: 25 | Performed by: FAMILY MEDICINE

## 2024-01-16 PROCEDURE — 90682 RIV4 VACC RECOMBINANT DNA IM: CPT | Performed by: FAMILY MEDICINE

## 2024-01-16 PROCEDURE — 85004 AUTOMATED DIFF WBC COUNT: CPT | Mod: ZL | Performed by: FAMILY MEDICINE

## 2024-01-16 RX ORDER — SERTRALINE HYDROCHLORIDE 100 MG/1
100 TABLET, FILM COATED ORAL DAILY
Qty: 90 TABLET | Refills: 4 | Status: SHIPPED | OUTPATIENT
Start: 2024-01-16

## 2024-01-16 RX ORDER — ROSUVASTATIN CALCIUM 40 MG/1
40 TABLET, COATED ORAL DAILY
Qty: 90 TABLET | Refills: 4 | Status: SHIPPED | OUTPATIENT
Start: 2024-01-16

## 2024-01-16 RX ORDER — POTASSIUM CHLORIDE 1500 MG/1
20 TABLET, EXTENDED RELEASE ORAL DAILY
Qty: 90 TABLET | Refills: 3 | Status: SHIPPED | OUTPATIENT
Start: 2024-01-16

## 2024-01-16 RX ORDER — GABAPENTIN 300 MG/1
300 CAPSULE ORAL AT BEDTIME
Qty: 90 CAPSULE | Refills: 4 | Status: SHIPPED | OUTPATIENT
Start: 2024-01-16

## 2024-01-16 RX ORDER — CHLORTHALIDONE 25 MG/1
25 TABLET ORAL DAILY
Qty: 90 TABLET | Refills: 4 | Status: SHIPPED | OUTPATIENT
Start: 2024-01-16

## 2024-01-16 ASSESSMENT — PAIN SCALES - GENERAL: PAINLEVEL: NO PAIN (0)

## 2024-01-16 ASSESSMENT — ENCOUNTER SYMPTOMS
CHILLS: 0
HEADACHES: 0
JOINT SWELLING: 0
CONSTIPATION: 0
HEARTBURN: 0
COUGH: 0
DIARRHEA: 0
HEMATURIA: 0
FEVER: 0
SORE THROAT: 0
DYSURIA: 0
HEMATOCHEZIA: 0
NERVOUS/ANXIOUS: 0
ARTHRALGIAS: 0
MYALGIAS: 0
WEAKNESS: 0
ABDOMINAL PAIN: 0
PARESTHESIAS: 0
FREQUENCY: 0
SHORTNESS OF BREATH: 0
PALPITATIONS: 0
DIZZINESS: 0
NAUSEA: 0
EYE PAIN: 0

## 2024-01-16 ASSESSMENT — PATIENT HEALTH QUESTIONNAIRE - PHQ9
SUM OF ALL RESPONSES TO PHQ QUESTIONS 1-9: 0
10. IF YOU CHECKED OFF ANY PROBLEMS, HOW DIFFICULT HAVE THESE PROBLEMS MADE IT FOR YOU TO DO YOUR WORK, TAKE CARE OF THINGS AT HOME, OR GET ALONG WITH OTHER PEOPLE: NOT DIFFICULT AT ALL
SUM OF ALL RESPONSES TO PHQ QUESTIONS 1-9: 0

## 2024-01-16 NOTE — NURSING NOTE
Patient here for annual physical and medication refills. Last eye exam unsure and last dental exam 2022. She does want to have COVID and flu vaccines today. Medication Reconciliation: complete.    Tanisha Christie LPN  1/16/2024 9:33 AM

## 2024-01-16 NOTE — LETTER
January 16, 2024      Lyn Massey  [address] [place] [address]        Dear ,    We are writing to inform you of your test results.    As you can see your potassium is slightly low.  I did send a refill for your potassium and would recommend taking 1 a day.  After a month or 2 we should follow-up with repeat potassium to make sure it is correcting itself.    Resulted Orders   Comprehensive Metabolic Panel   Result Value Ref Range    Sodium 137 135 - 145 mmol/L      Comment:      Reference intervals for this test were updated on 09/26/2023 to more accurately reflect our healthy population. There may be differences in the flagging of prior results with similar values performed with this method. Interpretation of those prior results can be made in the context of the updated reference intervals.     Potassium 2.9 (L) 3.4 - 5.3 mmol/L    Carbon Dioxide (CO2) 27 22 - 29 mmol/L    Anion Gap 16 (H) 7 - 15 mmol/L    Urea Nitrogen 18.6 8.0 - 23.0 mg/dL    Creatinine 1.01 (H) 0.51 - 0.95 mg/dL    GFR Estimate 62 >60 mL/min/1.73m2    Calcium 10.2 8.8 - 10.2 mg/dL    Chloride 94 (L) 98 - 107 mmol/L    Glucose 95 70 - 99 mg/dL    Alkaline Phosphatase 107 40 - 150 U/L      Comment:      Reference intervals for this test were updated on 11/14/2023 to more accurately reflect our healthy population. There may be differences in the flagging of prior results with similar values performed with this method. Interpretation of those prior results can be made in the context of the updated reference intervals.    AST 49 (H) 0 - 45 U/L      Comment:      Reference intervals for this test were updated on 6/12/2023 to more accurately reflect our healthy population. There may be differences in the flagging of prior results with similar values performed with this method. Interpretation of those prior results can be made in the context of the updated reference intervals.    ALT 47 0 - 50 U/L      Comment:      Reference  intervals for this test were updated on 6/12/2023 to more accurately reflect our healthy population. There may be differences in the flagging of prior results with similar values performed with this method. Interpretation of those prior results can be made in the context of the updated reference intervals.      Protein Total 8.0 6.4 - 8.3 g/dL    Albumin 4.9 3.5 - 5.2 g/dL    Bilirubin Total 0.5 <=1.2 mg/dL   Lipid Panel   Result Value Ref Range    Cholesterol 226 (H) <200 mg/dL    Triglycerides 137 <150 mg/dL    Direct Measure HDL 72 >=50 mg/dL    LDL Cholesterol Calculated 127 (H) <=100 mg/dL    Non HDL Cholesterol 154 (H) <130 mg/dL    Patient Fasting > 8hrs? Yes     Narrative    Cholesterol  Desirable:  <200 mg/dL    Triglycerides  Normal:  Less than 150 mg/dL  Borderline High:  150-199 mg/dL  High:  200-499 mg/dL  Very High:  Greater than or equal to 500 mg/dL    Direct Measure HDL  Female:  Greater than or equal to 50 mg/dL   Male:  Greater than or equal to 40 mg/dL    LDL Cholesterol  Desirable:  <100mg/dL  Above Desirable:  100-129 mg/dL   Borderline High:  130-159 mg/dL   High:  160-189 mg/dL   Very High:  >= 190 mg/dL    Non HDL Cholesterol  Desirable:  130 mg/dL  Above Desirable:  130-159 mg/dL  Borderline High:  160-189 mg/dL  High:  190-219 mg/dL  Very High:  Greater than or equal to 220 mg/dL   CBC with platelets and differential   Result Value Ref Range    WBC Count 9.6 4.0 - 11.0 10e3/uL    RBC Count 5.18 3.80 - 5.20 10e6/uL    Hemoglobin 16.6 (H) 11.7 - 15.7 g/dL    Hematocrit 45.6 35.0 - 47.0 %    MCV 88 78 - 100 fL    MCH 32.0 26.5 - 33.0 pg    MCHC 36.4 31.5 - 36.5 g/dL    RDW 12.5 10.0 - 15.0 %    Platelet Count 294 150 - 450 10e3/uL    % Neutrophils 71 %    % Lymphocytes 20 %    % Monocytes 7 %    % Eosinophils 1 %    % Basophils 1 %    % Immature Granulocytes 0 %    NRBCs per 100 WBC 0 <1 /100    Absolute Neutrophils 6.9 1.6 - 8.3 10e3/uL    Absolute Lymphocytes 1.9 0.8 - 5.3 10e3/uL     Absolute Monocytes 0.7 0.0 - 1.3 10e3/uL    Absolute Eosinophils 0.1 0.0 - 0.7 10e3/uL    Absolute Basophils 0.1 0.0 - 0.2 10e3/uL    Absolute Immature Granulocytes 0.0 <=0.4 10e3/uL    Absolute NRBCs 0.0 10e3/uL       If you have any questions or concerns, please call the clinic at the number listed above.       Sincerely,      Juanjose Walker MD

## 2024-01-17 DIAGNOSIS — Z12.11 ENCOUNTER FOR SCREENING COLONOSCOPY: Primary | ICD-10-CM

## 2024-01-17 NOTE — TELEPHONE ENCOUNTER
Screening Questions for the Scheduling of Screening Colonoscopies   (If Colonoscopy is diagnostic, Provider should review the chart before scheduling.)  Are you younger than 45 or older than 80?  NO  Do you take aspirin or fish oil?  NO (if yes, tell patient to stop 1 week prior to Colonoscopy)  Do you take warfarin (Coumadin), clopidogrel (Plavix), apixaban (Eliquis), dabigatram (Pradaxa), rivaroxaban (Xarelto) or any blood thinner? NO  Do you take Ozempic? NO  Do you use oxygen or a CPAP at home?  NO  Do you have kidney disease? NO  Are you on dialysis? NO  Have you had a stroke or heart attack in the last year? NO  Have you had a stent in your heart or any blood vessel in the last year? NO  Have you had a transplant of any organ? NO  Have you had a colonoscopy or upper endoscopy (EGD) before? YES         When?  2018  Date of scheduled Colonoscopy. 03/07/2024  Provider [facility]

## 2024-01-18 RX ORDER — POLYETHYLENE GLYCOL 3350, SODIUM CHLORIDE, SODIUM BICARBONATE, POTASSIUM CHLORIDE 420; 11.2; 5.72; 1.48 G/4L; G/4L; G/4L; G/4L
4000 POWDER, FOR SOLUTION ORAL ONCE
Qty: 4000 ML | Refills: 0 | Status: SHIPPED | OUTPATIENT
Start: 2024-02-29 | End: 2024-02-29

## 2024-01-18 RX ORDER — BISACODYL 5 MG/1
TABLET, DELAYED RELEASE ORAL
Qty: 2 TABLET | Refills: 0 | Status: ON HOLD | OUTPATIENT
Start: 2024-02-29 | End: 2024-03-07

## 2024-02-20 ENCOUNTER — HOSPITAL ENCOUNTER (OUTPATIENT)
Dept: MAMMOGRAPHY | Facility: OTHER | Age: 64
Discharge: HOME | End: 2024-02-20
Attending: FAMILY MEDICINE | Admitting: FAMILY MEDICINE
Payer: COMMERCIAL

## 2024-02-20 DIAGNOSIS — Z12.31 VISIT FOR SCREENING MAMMOGRAM: ICD-10-CM

## 2024-02-20 DIAGNOSIS — Z02.89 HEALTH EXAMINATION OF DEFINED SUBPOPULATION: ICD-10-CM

## 2024-02-20 PROCEDURE — 77063 BREAST TOMOSYNTHESIS BI: CPT

## 2024-03-07 ENCOUNTER — ANESTHESIA EVENT (OUTPATIENT)
Dept: SURGERY | Facility: OTHER | Age: 64
End: 2024-03-07
Payer: COMMERCIAL

## 2024-03-07 ENCOUNTER — HOSPITAL ENCOUNTER (OUTPATIENT)
Facility: OTHER | Age: 64
Discharge: HOME | End: 2024-03-07
Attending: SURGERY | Admitting: SURGERY
Payer: COMMERCIAL

## 2024-03-07 ENCOUNTER — ANESTHESIA (OUTPATIENT)
Dept: SURGERY | Facility: OTHER | Age: 64
End: 2024-03-07
Payer: COMMERCIAL

## 2024-03-07 VITALS
HEART RATE: 68 BPM | WEIGHT: 114 LBS | RESPIRATION RATE: 12 BRPM | SYSTOLIC BLOOD PRESSURE: 126 MMHG | HEIGHT: 62 IN | OXYGEN SATURATION: 95 % | BODY MASS INDEX: 20.98 KG/M2 | DIASTOLIC BLOOD PRESSURE: 62 MMHG | TEMPERATURE: 96.3 F

## 2024-03-07 PROCEDURE — 258N000003 HC RX IP 258 OP 636: Performed by: SURGERY

## 2024-03-07 PROCEDURE — 250N000011 HC RX IP 250 OP 636: Performed by: NURSE ANESTHETIST, CERTIFIED REGISTERED

## 2024-03-07 PROCEDURE — 250N000009 HC RX 250: Performed by: NURSE ANESTHETIST, CERTIFIED REGISTERED

## 2024-03-07 PROCEDURE — 999N000010 HC STATISTIC ANES STAT CODE-CRNA PER MINUTE: Performed by: SURGERY

## 2024-03-07 PROCEDURE — G0105 COLORECTAL SCRN; HI RISK IND: HCPCS | Performed by: SURGERY

## 2024-03-07 PROCEDURE — 45378 DIAGNOSTIC COLONOSCOPY: CPT | Performed by: SURGERY

## 2024-03-07 PROCEDURE — 45378 DIAGNOSTIC COLONOSCOPY: CPT | Performed by: NURSE ANESTHETIST, CERTIFIED REGISTERED

## 2024-03-07 RX ORDER — NALOXONE HYDROCHLORIDE 0.4 MG/ML
0.4 INJECTION, SOLUTION INTRAMUSCULAR; INTRAVENOUS; SUBCUTANEOUS
Status: DISCONTINUED | OUTPATIENT
Start: 2024-03-07 | End: 2024-03-07 | Stop reason: HOSPADM

## 2024-03-07 RX ORDER — NALOXONE HYDROCHLORIDE 0.4 MG/ML
0.2 INJECTION, SOLUTION INTRAMUSCULAR; INTRAVENOUS; SUBCUTANEOUS
Status: DISCONTINUED | OUTPATIENT
Start: 2024-03-07 | End: 2024-03-07 | Stop reason: HOSPADM

## 2024-03-07 RX ORDER — PROPOFOL 10 MG/ML
INJECTION, EMULSION INTRAVENOUS CONTINUOUS PRN
Status: DISCONTINUED | OUTPATIENT
Start: 2024-03-07 | End: 2024-03-07

## 2024-03-07 RX ORDER — LIDOCAINE HYDROCHLORIDE 20 MG/ML
INJECTION, SOLUTION INFILTRATION; PERINEURAL PRN
Status: DISCONTINUED | OUTPATIENT
Start: 2024-03-07 | End: 2024-03-07

## 2024-03-07 RX ORDER — FLUMAZENIL 0.1 MG/ML
0.2 INJECTION, SOLUTION INTRAVENOUS
Status: DISCONTINUED | OUTPATIENT
Start: 2024-03-07 | End: 2024-03-07 | Stop reason: HOSPADM

## 2024-03-07 RX ORDER — LIDOCAINE 40 MG/G
CREAM TOPICAL
Status: DISCONTINUED | OUTPATIENT
Start: 2024-03-07 | End: 2024-03-07 | Stop reason: HOSPADM

## 2024-03-07 RX ORDER — SODIUM CHLORIDE, SODIUM LACTATE, POTASSIUM CHLORIDE, CALCIUM CHLORIDE 600; 310; 30; 20 MG/100ML; MG/100ML; MG/100ML; MG/100ML
INJECTION, SOLUTION INTRAVENOUS CONTINUOUS
Status: DISCONTINUED | OUTPATIENT
Start: 2024-03-07 | End: 2024-03-07 | Stop reason: HOSPADM

## 2024-03-07 RX ORDER — ONDANSETRON 4 MG/1
4 TABLET, ORALLY DISINTEGRATING ORAL ONCE
Status: DISCONTINUED | OUTPATIENT
Start: 2024-03-07 | End: 2024-03-07

## 2024-03-07 RX ORDER — PROPOFOL 10 MG/ML
INJECTION, EMULSION INTRAVENOUS PRN
Status: DISCONTINUED | OUTPATIENT
Start: 2024-03-07 | End: 2024-03-07

## 2024-03-07 RX ORDER — ONDANSETRON 2 MG/ML
4 INJECTION INTRAMUSCULAR; INTRAVENOUS EVERY 6 HOURS PRN
Status: DISCONTINUED | OUTPATIENT
Start: 2024-03-07 | End: 2024-03-07 | Stop reason: HOSPADM

## 2024-03-07 RX ADMIN — LIDOCAINE HYDROCHLORIDE 40 MG: 20 INJECTION, SOLUTION INFILTRATION; PERINEURAL at 11:39

## 2024-03-07 RX ADMIN — PROPOFOL 175 MCG/KG/MIN: 10 INJECTION, EMULSION INTRAVENOUS at 11:39

## 2024-03-07 RX ADMIN — PROPOFOL 100 MG: 10 INJECTION, EMULSION INTRAVENOUS at 11:39

## 2024-03-07 RX ADMIN — ONDANSETRON 4 MG: 2 INJECTION INTRAMUSCULAR; INTRAVENOUS at 11:04

## 2024-03-07 RX ADMIN — SODIUM CHLORIDE, POTASSIUM CHLORIDE, SODIUM LACTATE AND CALCIUM CHLORIDE 30 ML/HR: 600; 310; 30; 20 INJECTION, SOLUTION INTRAVENOUS at 10:47

## 2024-03-07 ASSESSMENT — LIFESTYLE VARIABLES: TOBACCO_USE: 1

## 2024-03-07 ASSESSMENT — ACTIVITIES OF DAILY LIVING (ADL)
ADLS_ACUITY_SCORE: 31
ADLS_ACUITY_SCORE: 31

## 2024-03-07 NOTE — H&P
PRE-PROCEDURE NOTE    CHIEFCOMPLAINT / REASON FOR PROCEDURE:  Screening for polyps and colorectal cancer.    PERTINENT HISTORY   Patient is due for colonoscopy. Previous colonoscopy . No family history of colon polyps or colon cancer.    Past Medical History:   Diagnosis Date    Closed fracture of phalanx of finger     ,Index finger and laceration    Personal history of other medical treatment (CODE)     [id number] spontaneous first trimester losses       Past Surgical History:   Procedure Laterality Date    ARTHROSCOPY SHOULDER Right 2008     SECTION          COLONOSCOPY N/A 12/10/2018    Collegenous colitis.  1 tubular adenoma, follow up 5 years         Other:  None  Bleeding tendencies: No     Relevant Family History:  None     Relevant Social History:  None     10 point ROS of systems including Constitutional, Eyes, Respiratory, Cardiovascular, Gastroenterology, Genitourinary, Integumentary, Muscularskeletal, Psychiatric were all negative except for pertinent positives noted in my HPI.      ALLERGIES/SENSITIVITIES: No Known Allergies     CURRENT MEDICATIONS:    No current facility-administered medications on file prior to encounter.  chlorthalidone (HYGROTON) 25 MG tablet, Take 1 tablet (25 mg) by mouth daily  gabapentin (NEURONTIN) 300 MG capsule, Take 1 capsule (300 mg) by mouth at bedtime  potassium chloride ER (KLOR-CON M) 20 MEQ CR tablet, Take 1 tablet (20 mEq) by mouth daily  rosuvastatin (CRESTOR) 40 MG tablet, Take 1 tablet (40 mg) by mouth daily  sertraline (ZOLOFT) 100 MG tablet, Take 1 tablet (100 mg) by mouth daily            PRE-SEDATION ASSESSMENT:    LUNGS:  CTA B/L, no wheezing or crackles.  Heart & CV:  RRR no murmur.  Intact distal pulses, good cap refill.    Comment(s):      IMPRESSION: 63 year old female in need of screening colonoscopy.    PLAN:  I discussed screening colonoscopy with the patient. Anesthesia coverage requested.    Will Hutchison MD    3/7/2024 11:24 AM

## 2024-03-07 NOTE — ANESTHESIA POSTPROCEDURE EVALUATION
Patient: Lyn Massey    Procedure: Procedure(s):  Colonoscopy       Anesthesia Type:  MAC    Note:  Disposition: Outpatient   Postop Pain Control: Uneventful            Sign Out: Well controlled pain   PONV: No   Neuro/Psych: Uneventful            Sign Out: Acceptable/Baseline neuro status   Airway/Respiratory: Uneventful            Sign Out: Acceptable/Baseline resp. status   CV/Hemodynamics: Uneventful            Sign Out: Acceptable CV status; No obvious hypovolemia; No obvious fluid overload   Other NRE: NONE   DID A NON-ROUTINE EVENT OCCUR?            Last vitals:  Vitals:    03/07/24 1040 03/07/24 1203 03/07/24 1215   BP: (!) 143/76 126/68 126/62   Pulse: 68 81 68   Resp: 12 16 12   Temp: 97.6  F (36.4  C) (!) 96.3  F (35.7  C)    SpO2: 98% 93% 95%       Electronically Signed By: LAURA Christianson CRNA  March 7, 2024  12:56 PM

## 2024-03-07 NOTE — PROGRESS NOTES
Pt. has been discharged to home at 1245 via ambulatory accompanied by RN to door where pt.'s  Manish is in the car waiting.    Written discharge instructions were provided to pt.  Prescriptions were n/a.  Patient states their pain is 0/10, passing flatus, tolerated activity in room and drinking/eating, and denies any nausea or dizziness upon discharge.    Patient and adult caring for them verbalize understanding of discharge instructions including no driving until tomorrow and no longer taking narcotic pain medications - no operating mechanical equipment and no making any important decisions.They understand reason for discharge, and necessary follow-up appointments.

## 2024-03-07 NOTE — ANESTHESIA CARE TRANSFER NOTE
Patient: Lyn Massey    Procedure: Procedure(s):  Colonoscopy       Diagnosis: Tubular adenoma [D36.9]  Diagnosis Additional Information: No value filed.    Anesthesia Type:   MAC     Note:    Oropharynx: oropharynx clear of all foreign objects  Level of Consciousness: drowsy  Oxygen Supplementation: room air        Vital Signs Stable: post-procedure vital signs reviewed and stable  Report to RN Given: handoff report given  Patient transferred to: Phase II    Handoff Report: Identifed the Patient, Identified the Reponsible Provider, Reviewed the pertinent medical history, Discussed the surgical course, Reviewed Intra-OP anesthesia mangement and issues during anesthesia, Set expectations for post-procedure period and Allowed opportunity for questions and acknowledgement of understanding      Vitals:  Vitals Value Taken Time   BP     Temp     Pulse     Resp     SpO2         Electronically Signed By: LAURA Christianson CRNA  March 7, 2024  12:01 PM

## 2024-03-07 NOTE — ANESTHESIA PREPROCEDURE EVALUATION
Anesthesia Pre-Procedure Evaluation    Patient: Lyn Massey   MRN: [id number] : 1960        Procedure : Procedure(s):  Colonoscopy          Past Medical History:   Diagnosis Date    Closed fracture of phalanx of finger     ,Index finger and laceration    Personal history of other medical treatment (CODE)     [id number] spontaneous first trimester losses      Past Surgical History:   Procedure Laterality Date    ARTHROSCOPY SHOULDER Right 2008     SECTION          COLONOSCOPY N/A 12/10/2018    Collegenous colitis.  1 tubular adenoma, follow up 5 years      No Known Allergies   Social History     Tobacco Use    Smoking status: Every Day     Packs/day: 0.25     Years: 40.00     Additional pack years: 0.00     Total pack years: 10.00     Types: Cigarettes    Smokeless tobacco: Never   Substance Use Topics    Alcohol use: Yes     Comment: 2 glasses of wine a day      Wt Readings from Last 1 Encounters:   24 51.7 kg (114 lb)        Anesthesia Evaluation   Pt has had prior anesthetic.     No history of anesthetic complications       ROS/MED HX  ENT/Pulmonary:     (+)                tobacco use, Current use,   patient smoked within 24 hours,                    Neurologic:  - neg neurologic ROS     Cardiovascular:     (+) Dyslipidemia hypertension- -   -  - -                                      METS/Exercise Tolerance: >4 METS    Hematologic: Comments: Factor V leiden      Musculoskeletal:  - neg musculoskeletal ROS     GI/Hepatic:  - neg GI/hepatic ROS     Renal/Genitourinary:  - neg Renal ROS     Endo:  - neg endo ROS     Psychiatric/Substance Use:  - neg psychiatric ROS     Infectious Disease:  - neg infectious disease ROS     Malignancy:  - neg malignancy ROS     Other:  - neg other ROS          Physical Exam    Airway        Mallampati: II   TM distance: > 3 FB   Neck ROM: full   Mouth opening: > 3 cm    Respiratory Devices and Support         Dental       (+) Minor Abnormalities - some  "fillings, tiny chips      Cardiovascular   cardiovascular exam normal       Rhythm and rate: regular and normal     Pulmonary   pulmonary exam normal        breath sounds clear to auscultation           OUTSIDE LABS:  CBC:   Lab Results   Component Value Date    WBC 9.6 01/16/2024    WBC 9.8 10/13/2022    HGB 16.6 (H) 01/16/2024    HGB 16.0 (H) 10/13/2022    HCT 45.6 01/16/2024    HCT 46.2 10/13/2022     01/16/2024     10/13/2022     BMP:   Lab Results   Component Value Date     01/16/2024     10/13/2022    POTASSIUM 2.9 (L) 01/16/2024    POTASSIUM 3.2 (L) 10/13/2022    CHLORIDE 94 (L) 01/16/2024    CHLORIDE 97 (L) 10/13/2022    CO2 27 01/16/2024    CO2 31 10/13/2022    BUN 18.6 01/16/2024    BUN 16 10/13/2022    CR 1.01 (H) 01/16/2024    CR 0.95 10/13/2022    GLC 95 01/16/2024    GLC 95 10/13/2022     COAGS: No results found for: \"PTT\", \"INR\", \"FIBR\"  POC: No results found for: \"BGM\", \"HCG\", \"HCGS\"  HEPATIC:   Lab Results   Component Value Date    ALBUMIN 4.9 01/16/2024    PROTTOTAL 8.0 01/16/2024    ALT 47 01/16/2024    AST 49 (H) 01/16/2024    ALKPHOS 107 01/16/2024    BILITOTAL 0.5 01/16/2024     OTHER:   Lab Results   Component Value Date    JOSE DE JESUS 10.2 01/16/2024       Anesthesia Plan    ASA Status:  2    NPO Status:  NPO Appropriate    Anesthesia Type: MAC.   Induction: Propofol.   Maintenance: Balanced.        Consents    Anesthesia Plan(s) and associated risks, benefits, and realistic alternatives discussed. Questions answered and patient/representative(s) expressed understanding.     - Discussed: Risks, Benefits and Alternatives for BOTH SEDATION and the PROCEDURE were discussed     - Discussed with:  Patient      - Extended Intubation/Ventilatory Support Discussed: No.      - Patient is DNR/DNI Status: No     Use of blood products discussed: No .     Postoperative Care            Comments:               LAURA CHUNG CRNA    I have reviewed the pertinent notes and labs in " the chart from the past 30 days and (re)examined the patient.  Any updates or changes from those notes are reflected in this note.

## 2024-03-07 NOTE — DISCHARGE INSTRUCTIONS
[facility]  Adult Discharge Orders & Instructions    ________________________________________________________________          For 12 hours after surgery  Get plenty of rest.  A responsible adult must stay with you for at least 12 hours after you leave the hospital.   You may feel lightheaded.  IF so, sit for a few minutes before standing.  Have someone help you get up.   You may have a slight fever. Call the doctor if your fever is over 101 F (38.3 C) (taken under the tongue) or lasts longer than 24 hours.  You may have a dry mouth, a sore throat, muscle aches or trouble sleeping.  These should go away after 24 hours.  Do not make important or legal decisions.  6.   Do not drive or use heavy equipment.  If you have weakness or tingling, don't drive or use heavy equipment until this feeling goes away.    To contact a doctor, call   [phone]_______________________  
show

## 2024-03-07 NOTE — OP NOTE
PROCEDURE NOTE    SURGEON:Will Hutchison MD    PRE-OP DIAGNOSIS:  Screening Colonoscopy      POST-OP DIAGNOSIS: Normal    PROCEDURE:  Colonoscopy    SPECIMEN:      * No specimens in log *    ANESTHESIA:  MAC CRNA Independent: Jose Villareal APRN CRNA; Rafaela Clements APRN CRNA   Coverage requested    ESTIMATED BLOOD LOSS: none    COMPLICATIONS:  None    INDICATION FOR THE PROCEDURE: The patient is a 63 year old female. The patient presents with hx of adenoma. I explained to the patient the risks, benefits and alternatives to screening colonoscopy for evaluating for cancer or polyps. We discussed the risks including bleeding, perforation, potential inability to reach the cecum and the risks of sedation. The patient's questions were answered and the patient wished to proceed. Informed consent paperwork was completed.    PROCEDURE: The patient was taken to the endoscopy suite. Appropriate monitors were attached. The patient was placed in the left lateral decubitus position. Timeout was performed confirming the patient's identity and procedure to be performed.  After appropriate sedation was confirmed, digital rectal exam was performed.  There was normal tone and no gross abnormality was noted.  The lubricated colonoscope was introduced into the anus the colon was insufflated with air. The prep quality was adequate. Under direct visualization the scope was advanced to the cecum. The mucosa of the colon was inspected while withdrawing the scope. The scope was retroflexed in the rectum and the anorectal junction was inspected. No abnormalities were noted. The scope was returned to a neutral position and the colon was decompressed. The scope was removed. The patient tolerated the procedure with no immediately apparent complication. The patient was taken to recovery in stable condition.    FOLLOW UP: RECOMMEND high fiber diet, will call with pathology results.    Will Hutchison MD on 3/7/2024 at 11:56 AM

## 2024-04-09 ENCOUNTER — OFFICE VISIT (OUTPATIENT)
Dept: OTOLARYNGOLOGY | Facility: OTHER | Age: 64
End: 2024-04-09
Attending: OTOLARYNGOLOGY
Payer: COMMERCIAL

## 2024-04-09 DIAGNOSIS — H90.3 SENSORINEURAL HEARING LOSS, BILATERAL: Primary | ICD-10-CM

## 2024-04-09 PROCEDURE — G0463 HOSPITAL OUTPT CLINIC VISIT: HCPCS

## 2024-04-11 NOTE — PROGRESS NOTES
document embedded image  Patient Name: Lyn Massey   Address: [address] [address] [place] [address]    YOB: 1960   [place] [address]   MR Number: [id number]   Phone: [phone]  PCP: Rick Rivera MD           Appointment Date: 24  Visit Provider: Urbano Will MD    cc: Rcik Rivera MD; ~    ENT Progress Note    Intake  Visit Reasons: hearing loss/ test    HPI  History of Present Illness  Chief complaint:  Hearing loss    History  The patient is a 63-year-old female who presents to the office today for evaluation of her ears and hearing.  [place] had a lot of machinery noise exposure over the years.  She has a positive family history of hearing loss in her father and sister.  She denies chronic recurring ear infections.  She has had no otologic surgery.  She has no history consistent with ototoxicity.    Exam   The external auditory canals and TMs are clear bilaterally  Fork responses are normal  The remainder of the head neck exam is unremarkable   Audiogram-demonstrate asymmetric sensorineural hearing loss with the right ear down relative to the left.  Her right ear has a speech reception threshold of 35 decibels with a discrimination score of 84%.  Her left ear has a speech reception threshold of 25 decibels with a discrimination score of 96%.    PFSH  PFSH:     Past Medical History: (Updated 04/10/24 @ 11:05 by Margie Stephen, Med Assist)    Hypertension  Ear pressure   delivery delivered      Past Surgical History: (Updated 04/10/24 @ 11:04 by Margie Stephen, Med Assist)    H/O shoulder surgery      Family History: (Updated 04/10/24 @ 11:06 by Margie Stephen Med Assist)  Other  Hearing loss  Hypertension  Kidney disease        Social History: (Updated 04/10/24 @ 11:05 by Margie Stephen, Med Assist)  Smoking Status:  Current some day smoker tobacco type: cigarettes   Smoking cigarettes per day:  10   alcohol intake:  current     A&P  Assessment & Plan  (1) SNHL  (sensory-neural hearing loss), asymmetrical:         Status: Acute        Code(s):  H90.3 - Sensorineural hearing loss, bilateral  The patient has some intermittent stuffiness in her ears that sounds consistent with some Eustachian tube dysfunction.  She will go on a three-month trial of Flonase to see if it helps.  She would be a candidate for a hearing aid trial if she wished.  We would be happy to help her with a fitting if she likes.  Finally, we discussed an MRI with and without contrast to rule out acoustic neuroma.  She does wish to proceed with this and we will get the study ordered.                  Medications:   New  fluticasone propionate 50 mcg/actuation     administer into each nostril   2 sprays intranasal DAILY 47.4 grams 3RF                       Urbano Will MD    Filed: 04/10/24 0886     <Electronically signed by Urbano Will MD> 04/10/24 1545

## 2024-04-12 ENCOUNTER — HOSPITAL ENCOUNTER (OUTPATIENT)
Dept: MRI IMAGING | Facility: OTHER | Age: 64
Discharge: HOME | End: 2024-04-12
Attending: OTOLARYNGOLOGY | Admitting: OTOLARYNGOLOGY
Payer: COMMERCIAL

## 2024-04-12 DIAGNOSIS — H90.A21 SENSORINEURAL HEARING LOSS (SNHL) OF RIGHT EAR WITH RESTRICTED HEARING OF LEFT EAR: ICD-10-CM

## 2024-04-12 PROCEDURE — A9575 INJ GADOTERATE MEGLUMI 0.1ML: HCPCS | Mod: JZ | Performed by: OTOLARYNGOLOGY

## 2024-04-12 PROCEDURE — 70543 MRI ORBT/FAC/NCK W/O &W/DYE: CPT

## 2024-04-12 PROCEDURE — 255N000002 HC RX 255 OP 636: Mod: JZ | Performed by: OTOLARYNGOLOGY

## 2024-04-12 RX ORDER — GADOTERATE MEGLUMINE 376.9 MG/ML
15 INJECTION INTRAVENOUS ONCE
Status: COMPLETED | OUTPATIENT
Start: 2024-04-12 | End: 2024-04-12

## 2024-04-12 RX ADMIN — GADOTERATE MEGLUMINE 10 ML: 376.9 INJECTION INTRAVENOUS at 10:11

## 2024-12-17 ENCOUNTER — PATIENT OUTREACH (OUTPATIENT)
Dept: CARE COORDINATION | Facility: CLINIC | Age: 64
End: 2024-12-17
Payer: COMMERCIAL

## 2024-12-31 ENCOUNTER — PATIENT OUTREACH (OUTPATIENT)
Dept: CARE COORDINATION | Facility: CLINIC | Age: 64
End: 2024-12-31
Payer: COMMERCIAL

## 2025-02-24 ENCOUNTER — OFFICE VISIT (OUTPATIENT)
Dept: FAMILY MEDICINE | Facility: OTHER | Age: 65
End: 2025-02-24
Attending: FAMILY MEDICINE
Payer: COMMERCIAL

## 2025-02-24 VITALS
HEART RATE: 60 BPM | OXYGEN SATURATION: 93 % | DIASTOLIC BLOOD PRESSURE: 74 MMHG | TEMPERATURE: 97.4 F | BODY MASS INDEX: 23.04 KG/M2 | WEIGHT: 125.2 LBS | HEIGHT: 62 IN | RESPIRATION RATE: 20 BRPM | SYSTOLIC BLOOD PRESSURE: 154 MMHG

## 2025-02-24 DIAGNOSIS — Z00.00 ROUTINE GENERAL MEDICAL EXAMINATION AT A HEALTH CARE FACILITY: Primary | ICD-10-CM

## 2025-02-24 DIAGNOSIS — I10 BENIGN ESSENTIAL HYPERTENSION: ICD-10-CM

## 2025-02-24 DIAGNOSIS — F32.5 MAJOR DEPRESSION IN REMISSION: ICD-10-CM

## 2025-02-24 DIAGNOSIS — E78.5 HYPERLIPIDEMIA, UNSPECIFIED HYPERLIPIDEMIA TYPE: ICD-10-CM

## 2025-02-24 DIAGNOSIS — E78.2 MIXED HYPERLIPIDEMIA: ICD-10-CM

## 2025-02-24 DIAGNOSIS — G25.81 RESTLESS LEG SYNDROME: ICD-10-CM

## 2025-02-24 DIAGNOSIS — I10 ESSENTIAL HYPERTENSION: ICD-10-CM

## 2025-02-24 DIAGNOSIS — J31.0 CHRONIC RHINITIS: ICD-10-CM

## 2025-02-24 LAB
ANION GAP SERPL CALCULATED.3IONS-SCNC: 14 MMOL/L (ref 7–15)
BASOPHILS # BLD AUTO: 0.1 10E3/UL (ref 0–0.2)
BASOPHILS NFR BLD AUTO: 1 %
BUN SERPL-MCNC: 17 MG/DL (ref 8–23)
CALCIUM SERPL-MCNC: 10.2 MG/DL (ref 8.8–10.4)
CHLORIDE SERPL-SCNC: 95 MMOL/L (ref 98–107)
CHOLEST SERPL-MCNC: 240 MG/DL
CREAT SERPL-MCNC: 0.93 MG/DL (ref 0.51–0.95)
EGFRCR SERPLBLD CKD-EPI 2021: 68 ML/MIN/1.73M2
EOSINOPHIL # BLD AUTO: 0.1 10E3/UL (ref 0–0.7)
EOSINOPHIL NFR BLD AUTO: 1 %
ERYTHROCYTE [DISTWIDTH] IN BLOOD BY AUTOMATED COUNT: 12.4 % (ref 10–15)
FASTING STATUS PATIENT QL REPORTED: YES
FASTING STATUS PATIENT QL REPORTED: YES
GLUCOSE SERPL-MCNC: 103 MG/DL (ref 70–99)
HCO3 SERPL-SCNC: 26 MMOL/L (ref 22–29)
HCT VFR BLD AUTO: 44.7 % (ref 35–47)
HDLC SERPL-MCNC: 93 MG/DL
HGB BLD-MCNC: 16.2 G/DL (ref 11.7–15.7)
IMM GRANULOCYTES # BLD: 0 10E3/UL
IMM GRANULOCYTES NFR BLD: 0 %
LDLC SERPL CALC-MCNC: 116 MG/DL
LYMPHOCYTES # BLD AUTO: 2.1 10E3/UL (ref 0.8–5.3)
LYMPHOCYTES NFR BLD AUTO: 25 %
MCH RBC QN AUTO: 32.8 PG (ref 26.5–33)
MCHC RBC AUTO-ENTMCNC: 36.2 G/DL (ref 31.5–36.5)
MCV RBC AUTO: 91 FL (ref 78–100)
MONOCYTES # BLD AUTO: 0.6 10E3/UL (ref 0–1.3)
MONOCYTES NFR BLD AUTO: 8 %
NEUTROPHILS # BLD AUTO: 5.5 10E3/UL (ref 1.6–8.3)
NEUTROPHILS NFR BLD AUTO: 65 %
NONHDLC SERPL-MCNC: 147 MG/DL
NRBC # BLD AUTO: 0 10E3/UL
NRBC BLD AUTO-RTO: 0 /100
PLATELET # BLD AUTO: 269 10E3/UL (ref 150–450)
POTASSIUM SERPL-SCNC: 3.4 MMOL/L (ref 3.4–5.3)
RBC # BLD AUTO: 4.94 10E6/UL (ref 3.8–5.2)
SODIUM SERPL-SCNC: 135 MMOL/L (ref 135–145)
TRIGL SERPL-MCNC: 153 MG/DL
WBC # BLD AUTO: 8.4 10E3/UL (ref 4–11)

## 2025-02-24 PROCEDURE — 36415 COLL VENOUS BLD VENIPUNCTURE: CPT | Mod: ZL | Performed by: FAMILY MEDICINE

## 2025-02-24 PROCEDURE — 99396 PREV VISIT EST AGE 40-64: CPT | Performed by: FAMILY MEDICINE

## 2025-02-24 PROCEDURE — 80048 BASIC METABOLIC PNL TOTAL CA: CPT | Mod: ZL | Performed by: FAMILY MEDICINE

## 2025-02-24 PROCEDURE — 85004 AUTOMATED DIFF WBC COUNT: CPT | Mod: ZL | Performed by: FAMILY MEDICINE

## 2025-02-24 PROCEDURE — 80061 LIPID PANEL: CPT | Mod: ZL | Performed by: FAMILY MEDICINE

## 2025-02-24 RX ORDER — ROSUVASTATIN CALCIUM 40 MG/1
40 TABLET, COATED ORAL DAILY
Qty: 90 TABLET | Refills: 4 | Status: SHIPPED | OUTPATIENT
Start: 2025-02-24

## 2025-02-24 RX ORDER — SERTRALINE HYDROCHLORIDE 100 MG/1
100 TABLET, FILM COATED ORAL DAILY
Qty: 90 TABLET | Refills: 4 | Status: CANCELLED | OUTPATIENT
Start: 2025-02-24

## 2025-02-24 RX ORDER — CHLORTHALIDONE 25 MG/1
25 TABLET ORAL DAILY
Qty: 90 TABLET | Refills: 4 | Status: SHIPPED | OUTPATIENT
Start: 2025-02-24

## 2025-02-24 RX ORDER — FLUTICASONE PROPIONATE 50 MCG
2 SPRAY, SUSPENSION (ML) NASAL DAILY
COMMUNITY
Start: 2025-02-19 | End: 2025-02-24

## 2025-02-24 RX ORDER — GABAPENTIN 300 MG/1
300 CAPSULE ORAL AT BEDTIME
Qty: 90 CAPSULE | Refills: 0 | Status: SHIPPED | OUTPATIENT
Start: 2025-02-24

## 2025-02-24 RX ORDER — FLUTICASONE PROPIONATE 50 MCG
2 SPRAY, SUSPENSION (ML) NASAL DAILY
Qty: 18.2 ML | Refills: 11 | Status: SHIPPED | OUTPATIENT
Start: 2025-02-24

## 2025-02-24 SDOH — HEALTH STABILITY: PHYSICAL HEALTH: ON AVERAGE, HOW MANY DAYS PER WEEK DO YOU ENGAGE IN MODERATE TO STRENUOUS EXERCISE (LIKE A BRISK WALK)?: 4 DAYS

## 2025-02-24 ASSESSMENT — PATIENT HEALTH QUESTIONNAIRE - PHQ9
SUM OF ALL RESPONSES TO PHQ QUESTIONS 1-9: 0
SUM OF ALL RESPONSES TO PHQ QUESTIONS 1-9: 0

## 2025-02-24 ASSESSMENT — PAIN SCALES - GENERAL: PAINLEVEL_OUTOF10: NO PAIN (0)

## 2025-02-24 ASSESSMENT — SOCIAL DETERMINANTS OF HEALTH (SDOH): HOW OFTEN DO YOU GET TOGETHER WITH FRIENDS OR RELATIVES?: TWICE A WEEK

## 2025-02-24 NOTE — LETTER
February 24, 2025      Lyn Massey  [address] [place] [address]        Dear ,    We are writing to inform you of your test results.    Your test results fall within the expected range(s) or remain unchanged from previous results.  Please continue with current treatment plan.    Resulted Orders   Lipid Profile   Result Value Ref Range    Cholesterol 240 (H) <200 mg/dL    Triglycerides 153 (H) <150 mg/dL    Direct Measure HDL 93 >=50 mg/dL    LDL Cholesterol Calculated 116 (H) <100 mg/dL    Non HDL Cholesterol 147 (H) <130 mg/dL    Patient Fasting > 8hrs? Yes     Narrative    Cholesterol  Desirable: < 200 mg/dL  Borderline High: 200 - 239 mg/dL  High: >= 240 mg/dL    Triglycerides  Normal: < 150 mg/dL  Borderline High: 150 - 199 mg/dL  High: 200-499 mg/dL  Very High: >= 500 mg/dL    Direct Measure HDL  Female: >= 50 mg/dL   Male: >= 40 mg/dL    LDL Cholesterol  Desirable: < 100 mg/dL  Above Desirable: 100 - 129 mg/dL   Borderline High: 130 - 159 mg/dL   High:  160 - 189 mg/dL   Very High: >= 190 mg/dL    Non HDL Cholesterol  Desirable: < 130 mg/dL  Above Desirable: 130 - 159 mg/dL  Borderline High: 160 - 189 mg/dL  High: 190 - 219 mg/dL  Very High: >= 220 mg/dL   BASIC METABOLIC PANEL   Result Value Ref Range    Sodium 135 135 - 145 mmol/L    Potassium 3.4 3.4 - 5.3 mmol/L    Chloride 95 (L) 98 - 107 mmol/L    Carbon Dioxide (CO2) 26 22 - 29 mmol/L    Anion Gap 14 7 - 15 mmol/L    Urea Nitrogen 17.0 8.0 - 23.0 mg/dL    Creatinine 0.93 0.51 - 0.95 mg/dL    GFR Estimate 68 >60 mL/min/1.73m2      Comment:      eGFR calculated using 2021 CKD-EPI equation.    Calcium 10.2 8.8 - 10.4 mg/dL    Glucose 103 (H) 70 - 99 mg/dL    Patient Fasting > 8hrs? Yes    CBC with platelets and differential   Result Value Ref Range    WBC Count 8.4 4.0 - 11.0 10e3/uL    RBC Count 4.94 3.80 - 5.20 10e6/uL    Hemoglobin 16.2 (H) 11.7 - 15.7 g/dL    Hematocrit 44.7 35.0 - 47.0 %    MCV 91 78 - 100 fL    MCH 32.8 26.5 -  33.0 pg    MCHC 36.2 31.5 - 36.5 g/dL    RDW 12.4 10.0 - 15.0 %    Platelet Count 269 150 - 450 10e3/uL    % Neutrophils 65 %    % Lymphocytes 25 %    % Monocytes 8 %    % Eosinophils 1 %    % Basophils 1 %    % Immature Granulocytes 0 %    NRBCs per 100 WBC 0 <1 /100    Absolute Neutrophils 5.5 1.6 - 8.3 10e3/uL    Absolute Lymphocytes 2.1 0.8 - 5.3 10e3/uL    Absolute Monocytes 0.6 0.0 - 1.3 10e3/uL    Absolute Eosinophils 0.1 0.0 - 0.7 10e3/uL    Absolute Basophils 0.1 0.0 - 0.2 10e3/uL    Absolute Immature Granulocytes 0.0 <=0.4 10e3/uL    Absolute NRBCs 0.0 10e3/uL       If you have any questions or concerns, please call the clinic at the number listed above.       Sincerely,      Juanjose Walker MD    Electronically signed

## 2025-02-24 NOTE — NURSING NOTE
Patient here for annual physical and medication refills. Medication Reconciliation: complete.    Tanisha Christie LPN  2/24/2025 7:58 AM

## 2025-02-24 NOTE — PROGRESS NOTES
Preventive Care Visit  [facility] [place]  Juanjose Walker MD, Family Medicine  Feb 24, 2025      Assessment & Plan     Routine general medical examination at a health care facility  Satisfactory  - CBC and Differential; Future  - CBC and Differential    Benign essential hypertension  Currently not under good control.  Offered another blood pressure medication or to check blood pressures at home.  She will send her results if satisfactory follow-up if elevated at 1 month  - chlorthalidone (HYGROTON) 25 MG tablet; Take 1 tablet (25 mg) by mouth daily.    Restless leg syndrome  Currently working well  - gabapentin (NEURONTIN) 300 MG capsule; Take 1 capsule (300 mg) by mouth at bedtime.    Mixed hyperlipidemia  Continue  - rosuvastatin (CRESTOR) 40 MG tablet; Take 1 tablet (40 mg) by mouth daily.    Major depression in remission  Patient is currently doing well dropping her medication from 100-50.  - sertraline (ZOLOFT) 50 MG tablet; Take 1 tablet (50 mg) by mouth daily.    Hyperlipidemia, unspecified hyperlipidemia type  Follow-up  - Lipid Profile; Future  - Lipid Profile    Essential hypertension  Not under good control  - BASIC METABOLIC PANEL; Future  - BASIC METABOLIC PANEL    Chronic rhinitis  Refill  - fluticasone (FLONASE) 50 MCG/ACT nasal spray; Spray 2 sprays into both nostrils daily. SHAKE LIQUID AND USE            Nicotine/Tobacco Cessation  She reports that she has been smoking cigarettes. She has a 10 pack-year smoking history. She has never used smokeless tobacco.  Nicotine/Tobacco Cessation Plan  Information offered: Patient not interested at this time      Counseling  Appropriate preventive services were addressed with this patient via screening, questionnaire, or discussion as appropriate for fall prevention, nutrition, physical activity, Tobacco-use cessation, social engagement, weight loss and cognition.  Checklist reviewing preventive services available has been given to the  patient.  Reviewed patient's diet, addressing concerns and/or questions.       Preventive    No follow-ups on file.    Subjective   Lyn is a 64 year old, presenting for the following:  Physical           HPI  Patient arrives here for physical.  She is currently about 1 year for mammogram.  She would like to stay on a 2-year cycle.  She declines all immunizations.  She reports that she is doing well        Health Care Directive  Patient does not have a Health Care Directive: Discussed advance care planning with patient; however, patient declined at this time.      2/24/2025   General Health   How would you rate your overall physical health? Good   Feel stress (tense, anxious, or unable to sleep) Only a little   (!) STRESS CONCERN      2/24/2025   Nutrition   Three or more servings of calcium each day? Yes   Diet: Regular (no restrictions)   How many servings of fruit and vegetables per day? (!) 2-3   How many sweetened beverages each day? 0-1         2/24/2025   Exercise   Days per week of moderate/strenous exercise 4 days         2/24/2025   Social Factors   Frequency of gathering with friends or relatives Twice a week   Worry food won't last until get money to buy more No   Food not last or not have enough money for food? No   Do you have housing? (Housing is defined as stable permanent housing and does not include staying ouside in a car, in a tent, in an abandoned building, in an overnight shelter, or couch-surfing.) Yes   Are you worried about losing your housing? No   Lack of transportation? No   Unable to get utilities (heat,electricity)? No         2/24/2025   Fall Risk   Fallen 2 or more times in the past year? No   Trouble with walking or balance? No          2/24/2025   Dental   Dentist two times every year? Yes          Today's PHQ-9 Score:       2/24/2025     7:43 AM   PHQ-9 SCORE   PHQ-9 Total Score MyChart 0   PHQ-9 Total Score 0        Patient-reported         2/24/2025   Substance Use   Alcohol more  "than 3/day or more than 7/wk No   Do you use any other substances recreationally? No     Social History     Tobacco Use    Smoking status: Every Day     Current packs/day: 0.25     Average packs/day: 0.3 packs/day for 40.0 years (10.0 ttl pk-yrs)     Types: Cigarettes    Smokeless tobacco: Never   Vaping Use    Vaping status: Never Used   Substance Use Topics    Alcohol use: Yes     Comment: 2 glasses of wine a day    Drug use: No           2/20/2024   LAST FHS-7 RESULTS   1st degree relative breast or ovarian cancer No   Any relative bilateral breast cancer No   Any male have breast cancer No   Any ONE woman have BOTH breast AND ovarian cancer No   Any woman with breast cancer before 50yrs No   2 or more relatives with breast AND/OR ovarian cancer No   2 or more relatives with breast AND/OR bowel cancer No        Patient declines wanting to wait until next year        2/24/2025   STI Screening   New sexual partner(s) since last STI/HIV test? No     History of abnormal Pap smear: No - age 30-64 HPV with reflex Pap every 5 years recommended        Latest Ref Rng & Units 5/20/2021     8:42 AM   PAP / HPV   PAP (Historical)  NIL    HPV 16 DNA NEG^Negative Negative    HPV 18 DNA NEG^Negative Negative    Other HR HPV NEG^Negative Negative      ASCVD Risk   The 10-year ASCVD risk score (Velma MAST, et al., 2019) is: 22.1%    Values used to calculate the score:      Age: 64 years      Sex: Female      Is Non- : No      Diabetic: No      Tobacco smoker: Yes      Systolic Blood Pressure: 180 mmHg      Is BP treated: Yes      HDL Cholesterol: 72 mg/dL      Total Cholesterol: 226 mg/dL           Reviewed and updated as needed this visit by Provider                             Objective    Exam  BP (!) 180/84   Pulse 60   Temp 97.4  F (36.3  C)   Resp 20   Ht 1.575 m (5' 2\")   Wt 56.8 kg (125 lb 3.2 oz)   LMP  (LMP Unknown)   SpO2 93%   BMI 22.90 kg/m     Estimated body mass index is 22.9 " "kg/m  as calculated from the following:    Height as of this encounter: 1.575 m (5' 2\").    Weight as of this encounter: 56.8 kg (125 lb 3.2 oz).    Physical Exam  GENERAL: alert and no distress  NECK: no adenopathy, no asymmetry, masses, or scars  RESP: lungs clear to auscultation - no rales, rhonchi or wheezes  CV: regular rate and rhythm, normal S1 S2, no S3 or S4, no murmur, click or rub, no peripheral edema  ABDOMEN: soft, nontender, no hepatosplenomegaly, no masses and bowel sounds normal  MS: no gross musculoskeletal defects noted, no edema        Signed Electronically by: Juanjose Walker MD    Answers submitted by the patient for this visit:  Patient Health Questionnaire (Submitted on 2/24/2025)  PHQ9 TOTAL SCORE: 0    "

## 2025-08-17 DIAGNOSIS — G25.81 RESTLESS LEG SYNDROME: ICD-10-CM

## 2025-08-19 RX ORDER — GABAPENTIN 300 MG/1
300 CAPSULE ORAL AT BEDTIME
Qty: 90 CAPSULE | Refills: 1 | Status: SHIPPED | OUTPATIENT
Start: 2025-08-19

## (undated) DEVICE — ENDO TRAP POLYP E-TRAP 00711099

## (undated) DEVICE — TUBING SUCTION 10'X3/16" N510

## (undated) DEVICE — SUCTION MANIFOLD NEPTUNE 2 SYS 4 PORT 0702-020-000

## (undated) DEVICE — SOL WATER 1500ML

## (undated) DEVICE — ENDO BRUSH CHANNEL MASTER CLEANING 2-4.2MM BW-412T

## (undated) DEVICE — ENDO KIT COMPLIANCE DYKENDOCMPLY

## (undated) DEVICE — ENDO FORCEP ENDOJAW BIOPSY 2.8MMX230CM FB-220U

## (undated) DEVICE — ENDO SNARE EXACTO COLD 9MM LOOP 2.4MMX230CM 00711115

## (undated) RX ORDER — OXYCODONE AND ACETAMINOPHEN 5; 325 MG/1; MG/1
TABLET ORAL
Status: DISPENSED
Start: 2019-01-06

## (undated) RX ORDER — ONDANSETRON 2 MG/ML
INJECTION INTRAMUSCULAR; INTRAVENOUS
Status: DISPENSED
Start: 2024-03-07

## (undated) RX ORDER — PROPOFOL 10 MG/ML
INJECTION, EMULSION INTRAVENOUS
Status: DISPENSED
Start: 2024-03-07

## (undated) RX ORDER — PROPOFOL 10 MG/ML
INJECTION, EMULSION INTRAVENOUS
Status: DISPENSED
Start: 2018-12-10

## (undated) RX ORDER — LIDOCAINE HYDROCHLORIDE 20 MG/ML
INJECTION, SOLUTION EPIDURAL; INFILTRATION; INTRACAUDAL; PERINEURAL
Status: DISPENSED
Start: 2018-12-10